# Patient Record
Sex: FEMALE | Race: WHITE | NOT HISPANIC OR LATINO | URBAN - METROPOLITAN AREA
[De-identification: names, ages, dates, MRNs, and addresses within clinical notes are randomized per-mention and may not be internally consistent; named-entity substitution may affect disease eponyms.]

---

## 2017-02-02 ENCOUNTER — APPOINTMENT (RX ONLY)
Dept: URBAN - METROPOLITAN AREA CLINIC 129 | Facility: CLINIC | Age: 72
Setting detail: DERMATOLOGY
End: 2017-02-02

## 2017-02-02 DIAGNOSIS — L57.0 ACTINIC KERATOSIS: ICD-10-CM

## 2017-02-02 PROCEDURE — ? LIQUID NITROGEN

## 2017-02-02 PROCEDURE — ? COUNSELING

## 2017-02-02 PROCEDURE — ? DIAGNOSIS COMMENT

## 2017-02-02 PROCEDURE — 17000 DESTRUCT PREMALG LESION: CPT

## 2017-02-02 ASSESSMENT — LOCATION ZONE DERM: LOCATION ZONE: LEG

## 2017-02-02 ASSESSMENT — LOCATION SIMPLE DESCRIPTION DERM: LOCATION SIMPLE: LEFT ACHILLES SKIN

## 2017-02-02 ASSESSMENT — LOCATION DETAILED DESCRIPTION DERM: LOCATION DETAILED: LEFT ACHILLES SKIN

## 2017-02-02 NOTE — PROCEDURE: LIQUID NITROGEN
Render Post-Care Instructions In Note?: no
Duration Of Freeze Thaw-Cycle (Seconds): 0
Detail Level: Detailed
Number Of Freeze-Thaw Cycles: 1 freeze-thaw cycle
Post-Care Instructions: I reviewed with the patient in detail post-care instructions. Patient is to wear sunprotection, and avoid picking at any of the treated lesions. Pt may apply Vaseline to crusted or scabbing areas.
Consent: The patient's consent was obtained including but not limited to risks of crusting, scabbing, blistering, scarring, darker or lighter pigmentary change, recurrence, incomplete removal and infection.

## 2018-09-06 ENCOUNTER — APPOINTMENT (EMERGENCY)
Dept: RADIOLOGY | Facility: HOSPITAL | Age: 73
DRG: 494 | End: 2018-09-06
Payer: MEDICARE

## 2018-09-06 ENCOUNTER — HOSPITAL ENCOUNTER (INPATIENT)
Facility: HOSPITAL | Age: 73
LOS: 3 days | Discharge: NURSING, REHAB OR LONG-TERM CARE | DRG: 494 | End: 2018-09-10
Attending: EMERGENCY MEDICINE | Admitting: ORTHOPAEDIC SURGERY
Payer: MEDICARE

## 2018-09-06 DIAGNOSIS — S82.891A CLOSED FRACTURE OF RIGHT ANKLE, INITIAL ENCOUNTER: ICD-10-CM

## 2018-09-06 DIAGNOSIS — S82.201A CLOSED FRACTURE OF RIGHT TIBIA AND FIBULA, INITIAL ENCOUNTER: ICD-10-CM

## 2018-09-06 DIAGNOSIS — S82.401A CLOSED FRACTURE OF RIGHT TIBIA AND FIBULA, INITIAL ENCOUNTER: ICD-10-CM

## 2018-09-06 DIAGNOSIS — S82.851A CLOSED TRIMALLEOLAR FRACTURE OF RIGHT ANKLE, INITIAL ENCOUNTER: Primary | ICD-10-CM

## 2018-09-06 PROCEDURE — 73610 X-RAY EXAM OF ANKLE: CPT

## 2018-09-06 PROCEDURE — 70450 CT HEAD/BRAIN W/O DYE: CPT

## 2018-09-06 PROCEDURE — 96375 TX/PRO/DX INJ NEW DRUG ADDON: CPT

## 2018-09-06 PROCEDURE — 96374 THER/PROPH/DIAG INJ IV PUSH: CPT

## 2018-09-06 PROCEDURE — 73600 X-RAY EXAM OF ANKLE: CPT

## 2018-09-06 RX ORDER — ALENDRONATE SODIUM 70 MG/1
70 TABLET ORAL
COMMUNITY
End: 2021-08-03

## 2018-09-06 RX ORDER — LIDOCAINE HYDROCHLORIDE 10 MG/ML
30 INJECTION, SOLUTION EPIDURAL; INFILTRATION; INTRACAUDAL; PERINEURAL ONCE
Status: COMPLETED | OUTPATIENT
Start: 2018-09-06 | End: 2018-09-06

## 2018-09-06 RX ORDER — SIMVASTATIN 20 MG
20 TABLET ORAL
COMMUNITY

## 2018-09-06 RX ORDER — LEVOTHYROXINE SODIUM 0.05 MG/1
50 TABLET ORAL DAILY
COMMUNITY

## 2018-09-06 RX ORDER — MELATONIN
2000 DAILY
COMMUNITY

## 2018-09-06 RX ORDER — ONDANSETRON 2 MG/ML
4 INJECTION INTRAMUSCULAR; INTRAVENOUS ONCE
Status: COMPLETED | OUTPATIENT
Start: 2018-09-06 | End: 2018-09-06

## 2018-09-06 RX ORDER — METOCLOPRAMIDE HYDROCHLORIDE 5 MG/ML
10 INJECTION INTRAMUSCULAR; INTRAVENOUS ONCE
Status: COMPLETED | OUTPATIENT
Start: 2018-09-06 | End: 2018-09-06

## 2018-09-06 RX ORDER — PROPOFOL 10 MG/ML
80 INJECTION, EMULSION INTRAVENOUS ONCE
Status: COMPLETED | OUTPATIENT
Start: 2018-09-06 | End: 2018-09-06

## 2018-09-06 RX ORDER — IRBESARTAN 150 MG/1
150 TABLET ORAL
COMMUNITY
End: 2021-08-03

## 2018-09-06 RX ADMIN — ONDANSETRON 4 MG: 2 INJECTION INTRAMUSCULAR; INTRAVENOUS at 19:08

## 2018-09-06 RX ADMIN — HYDROMORPHONE HYDROCHLORIDE 0.5 MG: 1 INJECTION, SOLUTION INTRAMUSCULAR; INTRAVENOUS; SUBCUTANEOUS at 19:19

## 2018-09-06 RX ADMIN — LIDOCAINE HYDROCHLORIDE 30 ML: 10 INJECTION, SOLUTION EPIDURAL; INFILTRATION; INTRACAUDAL; PERINEURAL at 22:29

## 2018-09-06 RX ADMIN — METOCLOPRAMIDE 10 MG: 5 INJECTION, SOLUTION INTRAMUSCULAR; INTRAVENOUS at 20:21

## 2018-09-06 RX ADMIN — PROPOFOL 60 MG: 10 INJECTION, EMULSION INTRAVENOUS at 20:27

## 2018-09-06 NOTE — ED PROVIDER NOTES
History  Chief Complaint   Patient presents with   Jace Person down 1 step, no LOC, denies hitting head, + deformity to right ankle, P/M/S intact     69 yo F presents to ED for R ankle pain and deformity after fall  Pt says she was going outside to see the rainbow when she missed a step and had mechanical fall  Pt says she did hit her head  No LOC  She complains only of R ankle pain  No neck pain  Pt says she had no nausea until she was given pain medication in the ambulance by EMS  She denies numbness/tingling  Denies preceding chest pain, shortness of breath, lightheadedness, dizziness, or heart palpitations  Prior to Admission Medications   Prescriptions Last Dose Informant Patient Reported? Taking? alendronate (FOSAMAX) 70 mg tablet  Self Yes Yes   Sig: Take 70 mg by mouth every 7 days   cholecalciferol (VITAMIN D3) 1,000 units tablet  Self Yes Yes   Sig: Take 2,000 Units by mouth daily   ipratropium (ATROVENT HFA) 17 mcg/act inhaler  Self Yes Yes   Sig: Inhale 2 puffs every 6 (six) hours   irbesartan (AVAPRO) 150 mg tablet  Self Yes Yes   Sig: Take 150 mg by mouth daily at bedtime   levothyroxine 50 mcg tablet  Self Yes Yes   Sig: Take 50 mcg by mouth daily   simvastatin (ZOCOR) 20 mg tablet  Self Yes Yes   Sig: Take 20 mg by mouth daily at bedtime      Facility-Administered Medications: None       Past Medical History:   Diagnosis Date    Hypertension        History reviewed  No pertinent surgical history  History reviewed  No pertinent family history  I have reviewed and agree with the history as documented  Social History   Substance Use Topics    Smoking status: Never Smoker    Smokeless tobacco: Never Used    Alcohol use No        Review of Systems   Constitutional: Negative for activity change, chills and fever  HENT: Negative for congestion, rhinorrhea, sore throat and trouble swallowing  Eyes: Negative for pain, discharge and visual disturbance     Respiratory: Negative for cough, chest tightness and shortness of breath  Cardiovascular: Negative for chest pain, palpitations and leg swelling  Gastrointestinal: Negative for abdominal pain, nausea and vomiting  Genitourinary: Negative for dysuria, frequency and urgency  Musculoskeletal: Negative for gait problem, neck pain and neck stiffness  Skin: Negative for color change, rash and wound  Neurological: Negative for dizziness, syncope, light-headedness and headaches  Physical Exam  ED Triage Vitals [09/06/18 1834]   Temperature Pulse Respirations Blood Pressure SpO2   97 9 °F (36 6 °C) 84 18 136/64 94 %      Temp Source Heart Rate Source Patient Position - Orthostatic VS BP Location FiO2 (%)   Oral Monitor Lying Right arm --      Pain Score       9           Orthostatic Vital Signs  Vitals:    09/06/18 2115 09/06/18 2125 09/06/18 2126 09/06/18 2245   BP: 137/65 123/58 124/58 143/57   Pulse: 90 94 88 96   Patient Position - Orthostatic VS: Sitting Sitting Sitting Sitting       Physical Exam   Constitutional: She is oriented to person, place, and time  She appears well-developed and well-nourished  No distress  HENT:   Head: Normocephalic and atraumatic  Nose: Nose normal    Mouth/Throat: Oropharynx is clear and moist    No external evidence of head injury   Eyes: Conjunctivae and EOM are normal  Pupils are equal, round, and reactive to light  Left eye exhibits no discharge  Neck: Normal range of motion  Neck supple  nontender to palpation   Cardiovascular: Normal rate, regular rhythm and intact distal pulses  Pulmonary/Chest: Effort normal and breath sounds normal  No stridor  No respiratory distress  She exhibits no tenderness  Abdominal: Soft  There is no tenderness  There is no rebound  Musculoskeletal: She exhibits tenderness and deformity  R ankle deformity, tenderness   Neurological: She is alert and oriented to person, place, and time  No sensory deficit   She exhibits normal muscle tone    Skin: Skin is warm and dry  Capillary refill takes less than 2 seconds  Abrasion R ankle   Nursing note and vitals reviewed  ED Medications  Medications    EMS REPLENISHMENT MED ( Does not apply Given to EMS 9/6/18 1838)    EMS REPLENISHMENT MED ( Does not apply Given to EMS 9/6/18 1854)   ondansetron [facility]) injection 4 mg (4 mg Intravenous Given 9/6/18 1908)   HYDROmorphone (DILAUDID) injection 0 5 mg (0 5 mg Intravenous Given 9/6/18 1919)   propofol (DIPRIVAN) 200 MG/20ML bolus injection 80 mg (60 mg Intravenous Given by Other 9/6/18 2027)   metoclopramide (REGLAN) injection 10 mg (10 mg Intravenous Given 9/6/18 2021)   lidocaine (PF) (XYLOCAINE-MPF) 1 % injection 30 mL (30 mL Infiltration Given by Other 9/6/18 2229)       Diagnostic Studies  Results Reviewed     None                 CT head without contrast   Final Result by Lashawn Shell DO (09/06 2022)   No acute intracranial abnormality  Workstation performed: [id number]         XR ankle 2 vw right    (Results Pending)   XR ankle 3+ vw right    (Results Pending)         Procedures  Orthopedic Injury  Date/Time: 9/6/2018 8:30 PM  Performed by: Denisse Mar by: [facility]     Patient Location:  ED  Written consent obtained?: Yes    Risks and benefits: Risks, benefits and alternatives were discussed    Consent given by:  Patient and spouse  Patient states understanding of procedure being performed: Yes    Patient's understanding of procedure matches consent: Yes    Procedure consent matches procedure scheduled: Yes    Relevant documents present and verified: Yes    Test results available and properly labeled: Yes    Site marked: Yes    Radiology Images displayed and confirmed   If images not available, report reviewed: Yes    Required items: Required blood products, implants, devices and special equipment available    Patient identity confirmed:  Verbally with patient and arm band  Time out: Immediately prior to the procedure a time out was called    Injury location:  Ankle  Location details:  Right ankle  Injury type:  Fracture  Fracture type: trimalleolar    Neurovascular status: Neurovascularly intact    Distal perfusion: normal    Neurological function: normal    Range of motion: reduced    Sedation type: Moderate (conscious) sedation (See separate Procedural Sedation form)  Manipulation performed?: Yes    Reduction successful?: Yes    Confirmation: Reduction confirmed by x-ray    Immobilization:  Splint  Splint type: posterior leg, stirrup  Supplies used:  Ortho-Glass and elastic bandage  Neurovascular status: Neurovascularly intact    Distal perfusion: normal    Neurological function: normal    Patient tolerance:  Patient tolerated the procedure well with no immediate complications    Procedural Sedation  Date/Time: 9/6/2018 8:30 PM  Performed by: Gasper Abreu by: Kenia      Consent:     Consent obtained:  Written    Consent given by:  Patient and spouse    Risks discussed: Allergic reaction, prolonged hypoxia resulting in organ damage, prolonged sedation necessitating reversal, dysrhythmia, inadequate sedation, respiratory compromise necessitating ventilatory assistance and intubation, vomiting and nausea  Universal protocol:     Procedure explained and questions answered to patient or proxy's satisfaction: yes      Relevant documents present and verified: yes      Test results available and properly labeled: yes      Radiology Images displayed and confirmed    If images not available, report reviewed: yes      Required blood products, implants, devices, and special equipment available: yes      Site/side marked: yes      Immediately prior to procedure a time out was called: yes      Patient identity confirmation method:  Verbally with patient and arm band  Indications:     Sedation purpose:  Fracture reduction    Intended level of sedation:  Moderate (conscious sedation)  Immediate pre-procedure details:     Reassessment: Patient reassessed immediately prior to procedure      Reviewed: vital signs, relevant labs/tests and NPO status      Verified: bag valve mask available, emergency equipment available, intubation equipment available, IV patency confirmed, oxygen available and suction available    Procedure details (see MAR for exact dosages):     Sedation start time:  9/6/2018 8:30 PM    Preoxygenation:  Room air    Sedation:  Propofol    Intra-procedure monitoring:  Blood pressure monitoring, continuous capnometry, frequent LOC assessments, frequent vital sign checks, continuous pulse oximetry and cardiac monitor    Intra-procedure events: none      Intra-procedure management:  Airway repositioning    Sedation end time:  9/6/2018 9:02 PM    Total sedation time (minutes):  30  Post-procedure details:     Post-sedation assessment completed:  9/6/2018 9:03 PM    Attendance: Constant attendance by certified staff until patient recovered      Recovery: Patient returned to pre-procedure baseline      Post-sedation assessments completed and reviewed: airway patency, cardiovascular function, mental status, nausea/vomiting, pain level and respiratory function      Patient is stable for discharge or admission: yes      Patient tolerance: Tolerated well, no immediate complications      Conscious Sedation Assessment      Classification Score   ASA Scale Assessment  1-Healthy patient, no disease outside surgical process filed at 09/06/2018 2026          Phone Consults  ED Phone Contact    ED Course           Identification of Seniors at Risk      Most Recent Value   (ISAR) Identification of Seniors at Risk   Before the illness or injury that brought you to the Emergency, did you need someone to help you on a regular basis? 0 Filed at: 09/06/2018 1836   In the last 24 hours, have you needed more help than usual?  0 Filed at: 09/06/2018 1836   Have you been hospitalized for one or more nights during the past 6 months? 0 Filed at: 09/06/2018 1836   In general, do you see well?  0 Filed at: 09/06/2018 1836   In general, do you have serious problems with your memory? 0 Filed at: 09/06/2018 1836   Do you take more than three different medications every day? 1 Filed at: 09/06/2018 1836   ISAR Score  1 Filed at: 09/06/2018 1836                          [facility]  Number of Diagnoses or Management Options  Closed fracture of right tibia and fibula, initial encounter:   Diagnosis management comments: Trimalleolar R ankle fracture  Neurovascularly intact  Reduced at bedside with propofol  Remains neurovascularly intact  CT head negative  Ortho consulted and will admit pt, plan for OR tmrw  CritCare Time    Disposition  Final diagnoses:   Closed fracture of right tibia and fibula, initial encounter   Closed trimalleolar fracture of right ankle, initial encounter     Time reflects when diagnosis was documented in both MDM as applicable and the Disposition within this note     Time User Action Codes Description Comment    9/6/2018  8:42 PM Epimenio Girt Add [S82 201A,  S82 401A] Closed fracture of right tibia and fibula, initial encounter     9/6/2018 11:38 PM Evangelina Woodson Add [O93 759I] Closed fracture of right ankle, initial encounter     9/7/2018 12:01 AM Epimenio Girt Add [S61 101P] Closed trimalleolar fracture of right ankle, initial encounter     9/7/2018 12:01 AM Epimenio Girt Modify [G12 637C,  S82 401A] Closed fracture of right tibia and fibula, initial encounter     9/7/2018 12:01 AM Epimenio Girt Modify [C58 389W] Closed trimalleolar fracture of right ankle, initial encounter       ED Disposition     ED Disposition Condition Comment    Admit  Case was discussed with ortho and the patient's admission status was agreed to be Admission Status: observation status to the service of Dr Aryan Denis           Follow-up Information    None         Patient's Medications   Discharge Prescriptions    No medications on file     No discharge procedures on file     ED Provider  Attending physically available and evaluated Colton Balderas I managed the patient along with the ED Attending      Electronically Signed by         Zoraida Schneider MD  09/07/18 0001

## 2018-09-07 ENCOUNTER — APPOINTMENT (OUTPATIENT)
Dept: RADIOLOGY | Facility: HOSPITAL | Age: 73
DRG: 494 | End: 2018-09-07
Payer: MEDICARE

## 2018-09-07 ENCOUNTER — ANESTHESIA EVENT (OUTPATIENT)
Dept: PERIOP | Facility: HOSPITAL | Age: 73
DRG: 494 | End: 2018-09-07
Payer: MEDICARE

## 2018-09-07 ENCOUNTER — ANESTHESIA (OUTPATIENT)
Dept: PERIOP | Facility: HOSPITAL | Age: 73
DRG: 494 | End: 2018-09-07
Payer: MEDICARE

## 2018-09-07 PROBLEM — E03.8 OTHER SPECIFIED HYPOTHYROIDISM: Status: ACTIVE | Noted: 2018-09-07

## 2018-09-07 PROBLEM — D72.829 LEUKOCYTOSIS: Status: ACTIVE | Noted: 2018-09-07

## 2018-09-07 PROBLEM — J44.9 COPD (CHRONIC OBSTRUCTIVE PULMONARY DISEASE) (HCC): Status: ACTIVE | Noted: 2018-09-07

## 2018-09-07 PROBLEM — E78.49 OTHER HYPERLIPIDEMIA: Status: ACTIVE | Noted: 2018-09-07

## 2018-09-07 LAB
25(OH)D3 SERPL-MCNC: 28.3 NG/ML (ref 30–100)
ABO GROUP BLD: NORMAL
ALBUMIN SERPL BCP-MCNC: 3.4 G/DL (ref 3.5–5)
ALP SERPL-CCNC: 44 U/L (ref 46–116)
ALT SERPL W P-5'-P-CCNC: 25 U/L (ref 12–78)
ANION GAP SERPL CALCULATED.3IONS-SCNC: 6 MMOL/L (ref 4–13)
ANION GAP SERPL CALCULATED.3IONS-SCNC: 8 MMOL/L (ref 4–13)
APTT PPP: 27 SECONDS (ref 24–36)
AST SERPL W P-5'-P-CCNC: 18 U/L (ref 5–45)
ATRIAL RATE: 95 BPM
BILIRUB SERPL-MCNC: 0.37 MG/DL (ref 0.2–1)
BLD GP AB SCN SERPL QL: NEGATIVE
BUN SERPL-MCNC: 12 MG/DL (ref 5–25)
BUN SERPL-MCNC: 7 MG/DL (ref 5–25)
CALCIUM SERPL-MCNC: 7.9 MG/DL (ref 8.3–10.1)
CALCIUM SERPL-MCNC: 8.6 MG/DL (ref 8.3–10.1)
CHLORIDE SERPL-SCNC: 102 MMOL/L (ref 100–108)
CHLORIDE SERPL-SCNC: 103 MMOL/L (ref 100–108)
CO2 SERPL-SCNC: 28 MMOL/L (ref 21–32)
CO2 SERPL-SCNC: 29 MMOL/L (ref 21–32)
CREAT SERPL-MCNC: 0.65 MG/DL (ref 0.6–1.3)
CREAT SERPL-MCNC: 0.74 MG/DL (ref 0.6–1.3)
ERYTHROCYTE [DISTWIDTH] IN BLOOD BY AUTOMATED COUNT: 13 % (ref 11.6–15.1)
ERYTHROCYTE [SEDIMENTATION RATE] IN BLOOD: 38 MM/HOUR (ref 0–20)
GFR SERPL CREATININE-BSD FRML MDRD: 81 ML/MIN/1.73SQ M
GFR SERPL CREATININE-BSD FRML MDRD: 89 ML/MIN/1.73SQ M
GLUCOSE P FAST SERPL-MCNC: 137 MG/DL (ref 65–99)
GLUCOSE SERPL-MCNC: 116 MG/DL (ref 65–140)
GLUCOSE SERPL-MCNC: 137 MG/DL (ref 65–140)
HCT VFR BLD AUTO: 40.2 % (ref 34.8–46.1)
HGB BLD-MCNC: 12.8 G/DL (ref 11.5–15.4)
INR PPP: 0.98 (ref 0.86–1.17)
MCH RBC QN AUTO: 31.4 PG (ref 26.8–34.3)
MCHC RBC AUTO-ENTMCNC: 31.8 G/DL (ref 31.4–37.4)
MCV RBC AUTO: 99 FL (ref 82–98)
P AXIS: 58 DEGREES
PLATELET # BLD AUTO: 330 THOUSANDS/UL (ref 149–390)
PMV BLD AUTO: 9.8 FL (ref 8.9–12.7)
POTASSIUM SERPL-SCNC: 3.9 MMOL/L (ref 3.5–5.3)
POTASSIUM SERPL-SCNC: 4.1 MMOL/L (ref 3.5–5.3)
PR INTERVAL: 144 MS
PROT SERPL-MCNC: 7.2 G/DL (ref 6.4–8.2)
PROTHROMBIN TIME: 13.1 SECONDS (ref 11.8–14.2)
PTH-INTACT SERPL-MCNC: 135.2 PG/ML (ref 18.4–80.1)
QRS AXIS: 32 DEGREES
QRSD INTERVAL: 80 MS
QT INTERVAL: 370 MS
QTC INTERVAL: 464 MS
RBC # BLD AUTO: 4.08 MILLION/UL (ref 3.81–5.12)
RH BLD: POSITIVE
SODIUM SERPL-SCNC: 137 MMOL/L (ref 136–145)
SODIUM SERPL-SCNC: 139 MMOL/L (ref 136–145)
SPECIMEN EXPIRATION DATE: NORMAL
T WAVE AXIS: 51 DEGREES
T4 FREE SERPL-MCNC: 1.28 NG/DL (ref 0.76–1.46)
TSH SERPL DL<=0.05 MIU/L-ACNC: 3.29 UIU/ML (ref 0.36–3.74)
VENTRICULAR RATE: 95 BPM
WBC # BLD AUTO: 15.66 THOUSAND/UL (ref 4.31–10.16)

## 2018-09-07 PROCEDURE — C1769 GUIDE WIRE: HCPCS | Performed by: ORTHOPAEDIC SURGERY

## 2018-09-07 PROCEDURE — 27814 TREATMENT OF ANKLE FRACTURE: CPT | Performed by: PHYSICIAN ASSISTANT

## 2018-09-07 PROCEDURE — 86850 RBC ANTIBODY SCREEN: CPT | Performed by: ORTHOPAEDIC SURGERY

## 2018-09-07 PROCEDURE — 84443 ASSAY THYROID STIM HORMONE: CPT | Performed by: ORTHOPAEDIC SURGERY

## 2018-09-07 PROCEDURE — 85610 PROTHROMBIN TIME: CPT | Performed by: ORTHOPAEDIC SURGERY

## 2018-09-07 PROCEDURE — 99225 PR SBSQ OBSERVATION CARE/DAY 25 MINUTES: CPT | Performed by: INTERNAL MEDICINE

## 2018-09-07 PROCEDURE — C1713 ANCHOR/SCREW BN/BN,TIS/BN: HCPCS | Performed by: ORTHOPAEDIC SURGERY

## 2018-09-07 PROCEDURE — 99285 EMERGENCY DEPT VISIT HI MDM: CPT

## 2018-09-07 PROCEDURE — 82306 VITAMIN D 25 HYDROXY: CPT | Performed by: ORTHOPAEDIC SURGERY

## 2018-09-07 PROCEDURE — 84165 PROTEIN E-PHORESIS SERUM: CPT | Performed by: PATHOLOGY

## 2018-09-07 PROCEDURE — 0QSGXZZ REPOSITION RIGHT TIBIA, EXTERNAL APPROACH: ICD-10-PCS | Performed by: EMERGENCY MEDICINE

## 2018-09-07 PROCEDURE — 85652 RBC SED RATE AUTOMATED: CPT | Performed by: ORTHOPAEDIC SURGERY

## 2018-09-07 PROCEDURE — 80048 BASIC METABOLIC PNL TOTAL CA: CPT | Performed by: ORTHOPAEDIC SURGERY

## 2018-09-07 PROCEDURE — 85027 COMPLETE CBC AUTOMATED: CPT | Performed by: ORTHOPAEDIC SURGERY

## 2018-09-07 PROCEDURE — 80053 COMPREHEN METABOLIC PANEL: CPT | Performed by: ORTHOPAEDIC SURGERY

## 2018-09-07 PROCEDURE — 73600 X-RAY EXAM OF ANKLE: CPT

## 2018-09-07 PROCEDURE — 99222 1ST HOSP IP/OBS MODERATE 55: CPT | Performed by: ORTHOPAEDIC SURGERY

## 2018-09-07 PROCEDURE — 86901 BLOOD TYPING SEROLOGIC RH(D): CPT | Performed by: ORTHOPAEDIC SURGERY

## 2018-09-07 PROCEDURE — 0QSJ04Z REPOSITION RIGHT FIBULA WITH INTERNAL FIXATION DEVICE, OPEN APPROACH: ICD-10-PCS | Performed by: ORTHOPAEDIC SURGERY

## 2018-09-07 PROCEDURE — 84165 PROTEIN E-PHORESIS SERUM: CPT | Performed by: INTERNAL MEDICINE

## 2018-09-07 PROCEDURE — 93005 ELECTROCARDIOGRAM TRACING: CPT

## 2018-09-07 PROCEDURE — 84166 PROTEIN E-PHORESIS/URINE/CSF: CPT | Performed by: PATHOLOGY

## 2018-09-07 PROCEDURE — 71045 X-RAY EXAM CHEST 1 VIEW: CPT

## 2018-09-07 PROCEDURE — 86900 BLOOD TYPING SEROLOGIC ABO: CPT | Performed by: ORTHOPAEDIC SURGERY

## 2018-09-07 PROCEDURE — 93010 ELECTROCARDIOGRAM REPORT: CPT | Performed by: INTERNAL MEDICINE

## 2018-09-07 PROCEDURE — 83970 ASSAY OF PARATHORMONE: CPT | Performed by: ORTHOPAEDIC SURGERY

## 2018-09-07 PROCEDURE — 85730 THROMBOPLASTIN TIME PARTIAL: CPT | Performed by: ORTHOPAEDIC SURGERY

## 2018-09-07 PROCEDURE — 0QSG04Z REPOSITION RIGHT TIBIA WITH INTERNAL FIXATION DEVICE, OPEN APPROACH: ICD-10-PCS | Performed by: ORTHOPAEDIC SURGERY

## 2018-09-07 PROCEDURE — 27814 TREATMENT OF ANKLE FRACTURE: CPT | Performed by: ORTHOPAEDIC SURGERY

## 2018-09-07 PROCEDURE — 84439 ASSAY OF FREE THYROXINE: CPT | Performed by: INTERNAL MEDICINE

## 2018-09-07 PROCEDURE — 0QSJXZZ REPOSITION RIGHT FIBULA, EXTERNAL APPROACH: ICD-10-PCS | Performed by: EMERGENCY MEDICINE

## 2018-09-07 DEVICE — 4.0MM CANNULATED SCREW SHORT THREAD/50MM: Type: IMPLANTABLE DEVICE | Status: FUNCTIONAL

## 2018-09-07 DEVICE — 2.7MM/3.5MM LCP LATERAL DISTAL FIBULA PLATE 6H/RIGHT/112MM
Type: IMPLANTABLE DEVICE | Status: FUNCTIONAL
Brand: LCP

## 2018-09-07 DEVICE — 2.7MM LOCKING SCREW SLF-TPNG WITH T8 STARDRIVE RECESS 12MM: Type: IMPLANTABLE DEVICE | Status: FUNCTIONAL

## 2018-09-07 DEVICE — 2.7MM LOCKING SCREW SLF-TPNG WITH T8 STARDRIVE RECESS 18MM: Type: IMPLANTABLE DEVICE | Status: FUNCTIONAL

## 2018-09-07 DEVICE — 3.5MM CORTEX SCREW SELF-TAPPING 14MM: Type: IMPLANTABLE DEVICE | Status: FUNCTIONAL

## 2018-09-07 DEVICE — 2.7MM LOCKING SCREW SLF-TPNG WITH T8 STARDRIVE RECESS 16MM: Type: IMPLANTABLE DEVICE | Status: FUNCTIONAL

## 2018-09-07 RX ORDER — FENTANYL CITRATE 50 UG/ML
INJECTION, SOLUTION INTRAMUSCULAR; INTRAVENOUS AS NEEDED
Status: DISCONTINUED | OUTPATIENT
Start: 2018-09-07 | End: 2018-09-07 | Stop reason: SURG

## 2018-09-07 RX ORDER — PROPOFOL 10 MG/ML
INJECTION, EMULSION INTRAVENOUS AS NEEDED
Status: DISCONTINUED | OUTPATIENT
Start: 2018-09-07 | End: 2018-09-07 | Stop reason: SURG

## 2018-09-07 RX ORDER — LABETALOL HYDROCHLORIDE 5 MG/ML
INJECTION, SOLUTION INTRAVENOUS AS NEEDED
Status: DISCONTINUED | OUTPATIENT
Start: 2018-09-07 | End: 2018-09-07 | Stop reason: SURG

## 2018-09-07 RX ORDER — ALBUTEROL SULFATE 90 UG/1
AEROSOL, METERED RESPIRATORY (INHALATION) AS NEEDED
Status: DISCONTINUED | OUTPATIENT
Start: 2018-09-07 | End: 2018-09-07 | Stop reason: SURG

## 2018-09-07 RX ORDER — OXYCODONE HYDROCHLORIDE 5 MG/1
TABLET ORAL
Qty: 30 TABLET | Refills: 0 | Status: SHIPPED | OUTPATIENT
Start: 2018-09-07 | End: 2021-08-03

## 2018-09-07 RX ORDER — OXYCODONE HYDROCHLORIDE 5 MG/1
5 TABLET ORAL EVERY 4 HOURS PRN
Status: DISCONTINUED | OUTPATIENT
Start: 2018-09-07 | End: 2018-09-10 | Stop reason: HOSPADM

## 2018-09-07 RX ORDER — OXYCODONE HYDROCHLORIDE 10 MG/1
10 TABLET ORAL EVERY 4 HOURS PRN
Status: DISCONTINUED | OUTPATIENT
Start: 2018-09-07 | End: 2018-09-10 | Stop reason: HOSPADM

## 2018-09-07 RX ORDER — ONDANSETRON 2 MG/ML
4 INJECTION INTRAMUSCULAR; INTRAVENOUS EVERY 6 HOURS PRN
Status: DISCONTINUED | OUTPATIENT
Start: 2018-09-07 | End: 2018-09-10 | Stop reason: HOSPADM

## 2018-09-07 RX ORDER — ONDANSETRON 2 MG/ML
INJECTION INTRAMUSCULAR; INTRAVENOUS AS NEEDED
Status: DISCONTINUED | OUTPATIENT
Start: 2018-09-07 | End: 2018-09-07 | Stop reason: SURG

## 2018-09-07 RX ORDER — SUCCINYLCHOLINE CHLORIDE 20 MG/ML
INJECTION INTRAMUSCULAR; INTRAVENOUS AS NEEDED
Status: DISCONTINUED | OUTPATIENT
Start: 2018-09-07 | End: 2018-09-07 | Stop reason: SURG

## 2018-09-07 RX ORDER — LIDOCAINE HYDROCHLORIDE 10 MG/ML
INJECTION, SOLUTION INFILTRATION; PERINEURAL AS NEEDED
Status: DISCONTINUED | OUTPATIENT
Start: 2018-09-07 | End: 2018-09-07 | Stop reason: SURG

## 2018-09-07 RX ORDER — PRAVASTATIN SODIUM 40 MG
40 TABLET ORAL
Status: DISCONTINUED | OUTPATIENT
Start: 2018-09-07 | End: 2018-09-10 | Stop reason: HOSPADM

## 2018-09-07 RX ORDER — LEVOTHYROXINE SODIUM 0.05 MG/1
50 TABLET ORAL
Status: DISCONTINUED | OUTPATIENT
Start: 2018-09-07 | End: 2018-09-10 | Stop reason: HOSPADM

## 2018-09-07 RX ORDER — MIDAZOLAM HYDROCHLORIDE 1 MG/ML
INJECTION INTRAMUSCULAR; INTRAVENOUS AS NEEDED
Status: DISCONTINUED | OUTPATIENT
Start: 2018-09-07 | End: 2018-09-07 | Stop reason: SURG

## 2018-09-07 RX ORDER — LOSARTAN POTASSIUM 50 MG/1
50 TABLET ORAL DAILY
Status: DISCONTINUED | OUTPATIENT
Start: 2018-09-07 | End: 2018-09-10 | Stop reason: HOSPADM

## 2018-09-07 RX ORDER — MAGNESIUM HYDROXIDE 1200 MG/15ML
LIQUID ORAL AS NEEDED
Status: DISCONTINUED | OUTPATIENT
Start: 2018-09-07 | End: 2018-09-07 | Stop reason: HOSPADM

## 2018-09-07 RX ORDER — FENTANYL CITRATE/PF 50 MCG/ML
25 SYRINGE (ML) INJECTION
Status: COMPLETED | OUTPATIENT
Start: 2018-09-07 | End: 2018-09-07

## 2018-09-07 RX ORDER — ONDANSETRON 2 MG/ML
4 INJECTION INTRAMUSCULAR; INTRAVENOUS ONCE
Status: DISCONTINUED | OUTPATIENT
Start: 2018-09-07 | End: 2018-09-10 | Stop reason: HOSPADM

## 2018-09-07 RX ORDER — ACETAMINOPHEN 325 MG/1
975 TABLET ORAL EVERY 8 HOURS SCHEDULED
Status: DISCONTINUED | OUTPATIENT
Start: 2018-09-07 | End: 2018-09-10 | Stop reason: HOSPADM

## 2018-09-07 RX ORDER — ONDANSETRON 2 MG/ML
4 INJECTION INTRAMUSCULAR; INTRAVENOUS ONCE AS NEEDED
Status: DISCONTINUED | OUTPATIENT
Start: 2018-09-07 | End: 2018-09-07 | Stop reason: HOSPADM

## 2018-09-07 RX ORDER — DOCUSATE SODIUM 100 MG/1
100 CAPSULE, LIQUID FILLED ORAL 2 TIMES DAILY
Status: DISCONTINUED | OUTPATIENT
Start: 2018-09-07 | End: 2018-09-10 | Stop reason: HOSPADM

## 2018-09-07 RX ORDER — CALCIUM CARBONATE 200(500)MG
1000 TABLET,CHEWABLE ORAL ONCE
Status: DISCONTINUED | OUTPATIENT
Start: 2018-09-07 | End: 2018-09-08

## 2018-09-07 RX ORDER — SODIUM CHLORIDE, SODIUM LACTATE, POTASSIUM CHLORIDE, CALCIUM CHLORIDE 600; 310; 30; 20 MG/100ML; MG/100ML; MG/100ML; MG/100ML
INJECTION, SOLUTION INTRAVENOUS CONTINUOUS PRN
Status: DISCONTINUED | OUTPATIENT
Start: 2018-09-07 | End: 2018-09-07 | Stop reason: SURG

## 2018-09-07 RX ORDER — SENNOSIDES 8.6 MG
1 TABLET ORAL DAILY
Status: DISCONTINUED | OUTPATIENT
Start: 2018-09-07 | End: 2018-09-10 | Stop reason: HOSPADM

## 2018-09-07 RX ORDER — CEFAZOLIN SODIUM 1 G/3ML
INJECTION, POWDER, FOR SOLUTION INTRAMUSCULAR; INTRAVENOUS AS NEEDED
Status: DISCONTINUED | OUTPATIENT
Start: 2018-09-07 | End: 2018-09-07 | Stop reason: SURG

## 2018-09-07 RX ADMIN — HYDROMORPHONE HYDROCHLORIDE 0.5 MG: 1 INJECTION, SOLUTION INTRAMUSCULAR; INTRAVENOUS; SUBCUTANEOUS at 14:00

## 2018-09-07 RX ADMIN — FENTANYL CITRATE 25 MCG: 50 INJECTION, SOLUTION INTRAMUSCULAR; INTRAVENOUS at 12:41

## 2018-09-07 RX ADMIN — SUCCINYLCHOLINE CHLORIDE 100 MG: 20 INJECTION, SOLUTION INTRAMUSCULAR; INTRAVENOUS at 12:08

## 2018-09-07 RX ADMIN — FENTANYL CITRATE 25 MCG: 50 INJECTION, SOLUTION INTRAMUSCULAR; INTRAVENOUS at 13:45

## 2018-09-07 RX ADMIN — FENTANYL CITRATE 25 MCG: 50 INJECTION, SOLUTION INTRAMUSCULAR; INTRAVENOUS at 13:30

## 2018-09-07 RX ADMIN — LIDOCAINE HYDROCHLORIDE 50 MG: 10 INJECTION, SOLUTION INFILTRATION; PERINEURAL at 11:56

## 2018-09-07 RX ADMIN — ACETAMINOPHEN 975 MG: 325 TABLET, FILM COATED ORAL at 21:50

## 2018-09-07 RX ADMIN — ACETAMINOPHEN 975 MG: 325 TABLET, FILM COATED ORAL at 06:02

## 2018-09-07 RX ADMIN — HYDROMORPHONE HYDROCHLORIDE 0.5 MG: 1 INJECTION, SOLUTION INTRAMUSCULAR; INTRAVENOUS; SUBCUTANEOUS at 14:35

## 2018-09-07 RX ADMIN — FENTANYL CITRATE 50 MCG: 50 INJECTION, SOLUTION INTRAMUSCULAR; INTRAVENOUS at 11:58

## 2018-09-07 RX ADMIN — ALBUTEROL SULFATE 4 PUFF: 90 AEROSOL, METERED RESPIRATORY (INHALATION) at 12:55

## 2018-09-07 RX ADMIN — SODIUM CHLORIDE, SODIUM LACTATE, POTASSIUM CHLORIDE, AND CALCIUM CHLORIDE: .6; .31; .03; .02 INJECTION, SOLUTION INTRAVENOUS at 11:48

## 2018-09-07 RX ADMIN — FENTANYL CITRATE 50 MCG: 50 INJECTION, SOLUTION INTRAMUSCULAR; INTRAVENOUS at 12:15

## 2018-09-07 RX ADMIN — FENTANYL CITRATE 25 MCG: 50 INJECTION, SOLUTION INTRAMUSCULAR; INTRAVENOUS at 13:35

## 2018-09-07 RX ADMIN — HYDROMORPHONE HYDROCHLORIDE 0.5 MG: 1 INJECTION, SOLUTION INTRAMUSCULAR; INTRAVENOUS; SUBCUTANEOUS at 16:25

## 2018-09-07 RX ADMIN — IPRATROPIUM BROMIDE 2 PUFF: 17 AEROSOL, METERED RESPIRATORY (INHALATION) at 21:50

## 2018-09-07 RX ADMIN — FENTANYL CITRATE 25 MCG: 50 INJECTION, SOLUTION INTRAMUSCULAR; INTRAVENOUS at 13:40

## 2018-09-07 RX ADMIN — CEFAZOLIN SODIUM 2000 MG: 2 SOLUTION INTRAVENOUS at 21:50

## 2018-09-07 RX ADMIN — PROPOFOL 200 MG: 10 INJECTION, EMULSION INTRAVENOUS at 11:56

## 2018-09-07 RX ADMIN — ALBUTEROL SULFATE 2 PUFF: 90 AEROSOL, METERED RESPIRATORY (INHALATION) at 12:12

## 2018-09-07 RX ADMIN — LABETALOL HYDROCHLORIDE 5 MG: 5 INJECTION, SOLUTION INTRAVENOUS at 13:13

## 2018-09-07 RX ADMIN — FENTANYL CITRATE 50 MCG: 50 INJECTION, SOLUTION INTRAMUSCULAR; INTRAVENOUS at 12:04

## 2018-09-07 RX ADMIN — FENTANYL CITRATE 25 MCG: 50 INJECTION, SOLUTION INTRAMUSCULAR; INTRAVENOUS at 12:39

## 2018-09-07 RX ADMIN — IPRATROPIUM BROMIDE 2 PUFF: 17 AEROSOL, METERED RESPIRATORY (INHALATION) at 09:22

## 2018-09-07 RX ADMIN — CEFAZOLIN 2000 MG: 1 INJECTION, POWDER, FOR SOLUTION INTRAVENOUS at 11:58

## 2018-09-07 RX ADMIN — ONDANSETRON 4 MG: 2 INJECTION INTRAMUSCULAR; INTRAVENOUS at 12:55

## 2018-09-07 RX ADMIN — MIDAZOLAM 2 MG: 1 INJECTION INTRAMUSCULAR; INTRAVENOUS at 11:48

## 2018-09-07 RX ADMIN — HYDROMORPHONE HYDROCHLORIDE 0.5 MG: 1 INJECTION, SOLUTION INTRAMUSCULAR; INTRAVENOUS; SUBCUTANEOUS at 13:55

## 2018-09-07 RX ADMIN — DEXAMETHASONE SODIUM PHOSPHATE 5 MG: 10 INJECTION INTRAMUSCULAR; INTRAVENOUS at 12:15

## 2018-09-07 RX ADMIN — LEVOTHYROXINE SODIUM 50 MCG: 50 TABLET ORAL at 06:02

## 2018-09-07 NOTE — PLAN OF CARE
Problem: DISCHARGE PLANNING - CARE MANAGEMENT  Goal: Discharge to post-acute care or home with appropriate resources  INTERVENTIONS:  - Conduct assessment to determine patient/family and health care team treatment goals, and need for post-acute services based on payer coverage, community resources, and patient preferences, and barriers to discharge  - Address psychosocial, clinical, and financial barriers to discharge as identified in assessment in conjunction with the patient/family and health care team  - Arrange appropriate level of post-acute services according to patient's   needs and preference and payer coverage in collaboration with the physician and health care team  - Communicate with and update the patient/family, physician, and health care team regarding progress on the discharge plan  - Arrange appropriate transportation to post-acute venues  - CM will assist Pt with a price check on recommended d/c medication of Lovenox  Outcome: Progressing

## 2018-09-07 NOTE — OP NOTE
OPERATIVE REPORT  PATIENT NAME: Clementina Hammer    :  1945  MRN: [id number]  Pt Location: BE OR ROOM 18    SURGERY DATE: 2018    Surgeon(s) and Role:     * Mela Howard MD - Primary     * Mary Leyva PA-C - Assisting ( No qualified resident available)    Preop Diagnosis:  Closed fracture of right ankle, initial encounter [P02 135Q]    Post-Op Diagnosis Codes:     * Closed fracture of right ankle, initial encounter [H34 153M]    Procedure(s) (LRB):  OPEN REDUCTION W/ INTERNAL FIXATION (ORIF) ANKLE (Right)    Specimen(s):  * No specimens in log *    Estimated Blood Loss:   Minimal    Drains:       Anesthesia Type:   General    Operative Indications:  Closed fracture of right ankle, initial encounter [Q77 415I]      Operative Findings:  Right bimalleolar ankle fracture    Complications:   None    Procedure and Technique:  Open reduction internal fixation right ankle fracture  Patient was correctly identified by both the anesthesia department and myself  The right lower extremity was marked  She was taken to the operating room with general anesthesia was induced in the supine position  She was positioned supine onto radiolucent flat table ensuring that all bony prominences and neurovascular structures were adequately padded and protected  A tourniquet was applied to the right lower extremity  A bump was placed underneath the right hip  The right lower extremity was prepped and draped in the usual sterile fashion  An SCD had been attached left side  IV Ancef was administered for preoperative antibiotics  Time-out was performed  AP and lateral imaging demonstrated with the incisions would be made overlying the lateral malleolus as well as the medial malleolus  The lower extremity was elevated and exsanguinated with the use of an Esmarch  The tourniquet was inflated to a pressure of 300 mm of mercury    A 6 cm longitudinal incision was made over the lateral aspect of the fibula with the use of a 10 blade  Distally dissection was carried sharply to the level of the bone with the use of the Bovie electric cautery  In the midportion of the incision and proximally the dissection was performed with Metzenbaum scissors and pickups to avoid damage to the superficial peroneal nerve  Fracture site was identified and delineated  Bone reduction forceps were used to hold the fibula in alignment  Significant comminution was encountered at the fracture site not amenable to lag screw fixation  A Synthes 6 hole distal fibular locking plate was then applied in a bridging fashion this was secured in place distally with locking screws and proximally with nonlocking cortical screws  Following fixation AP and lateral imaging demonstrated appropriate length and reduction/fixation of the fibular fracture  Attention was paid to the medial side  A 3 cm curvilinear incision was made over the medial malleolus  The saphenous vein was identified and protected  The fracture site was identified  A reduction was performed with bone reduction forceps  Two K-wires were then passed from the tip of the medial malleolus proximally in a divergent fashion  Measurement was taken  Cannulated short threaded screws measuring 50 mm were passed over the K-wires  K-wires removed and the screws were final tightened  Final AP and lateral imaging demonstrated appropriate fixation with symmetric mortise  Stress test was performed across the ankle with no evidence of medial clear space widening  The mortise view demonstrated good alignment and fixation  Copious irrigation was performed to all wounds  Hemostasis was achieved  The subcu tissue overlying the plate was reapproximated with 0 Vicryl suture  Subcutaneous layer on the lateral side was reapproximated with 2 Vicryl suture in interrupted fashion  Three nylon sutures utilized on the skin   On the medial side subcutaneous layer was closed with 2 Vicryl suture and the skin was closed with 3 nylon suture in interrupted fashion  Sterile dressings were applied to the wound   The tourniquet was let down after 54 min  A well-molded AO splint was then applied         I was present for the entire procedure    Patient Disposition:  PACU      Implants Synthes 6 hole distal fibula locking plate    SIGNATURE: Deon Gardner MD  DATE: September 7, 2018  TIME: 1:25 PM

## 2018-09-07 NOTE — ASSESSMENT & PLAN NOTE
History of COPD diagnosed 2 years ago not on home oxygen  She is only on Atrovent inhaler which will be continued  May proceed with surgery

## 2018-09-07 NOTE — CASE MANAGEMENT
Initial Clinical Review    Admission: Date/Time/Statement: Observation 9/7 and changed to Inpatient on 9/7 @ 1551    Admitting Physician Therese Moctezuma    Level of Care Med Surg    Estimated length of stay More than 2 Midnights    Certification I certify that inpatient services are medically necessary for this patient for a duration of greater than two midnights  See H&P and MD Progress Notes for additional information about the patient's course of treatment  ED: Date/Time/Mode of Arrival:   ED Arrival Information     Expected Arrival Acuity Means of Arrival Escorted By Service Admission Type    - 9/6/2018 18:33 Urgent Ambulance [facility]ulance General Medicine Urgent    Arrival Complaint    Right Ankle Pain          Chief Complaint:   Chief Complaint   Patient presents with    Fall     Fall down 1 step, no LOC, denies hitting head, + deformity to right ankle, P/M/S intact       History of Illness: 68 y  o female status post fall from 1 step complaining of right ankle pain and inability to bear weight  The patient had a mechanical fall while walking out her front porch  She immediately felt pain, was unable to bear weight, and noticed a deformity of the ankle  She also reports hitting her head, but denies LOC or HA  ED Vital Signs:   ED Triage Vitals [09/06/18 1834]   Temperature Pulse Respirations Blood Pressure SpO2   97 9 °F (36 6 °C) 84 18 136/64 94 %      Temp Source Heart Rate Source Patient Position - Orthostatic VS BP Location FiO2 (%)   Oral Monitor Lying Right arm --      Pain Score       9        Wt Readings from Last 1 Encounters:   09/07/18 75 8 kg (167 lb)       Vital Signs (abnormal): WNL    Abnormal Labs:    09/07/18 0307    WBC 4 31 - 10 16 Thousand/uL 15 66       Calcium 8 3 - 10 1 mg/dL 7 9       Diagnostic Test Results: Xray Right Ankle -  Tibiofibular fractures in a Lauge Luz PER 3 pattern  Possible posterior malleolus fracture      ED Treatment:   Medication Administration from 09/06/2018 1833 to 09/07/2018 0027       Date/Time Order Dose Route Action     09/06/2018 1908 ondansetron (ZOFRAN) injection 4 mg 4 mg Intravenous Given     09/06/2018 1919 HYDROmorphone (DILAUDID) injection 0 5 mg 0 5 mg Intravenous Given     09/06/2018 2027 propofol (DIPRIVAN) 200 MG/20ML bolus injection 80 mg 60 mg Intravenous Given by Other     09/06/2018 2021 metoclopramide (REGLAN) injection 10 mg 10 mg Intravenous Given     09/06/2018 2229 lidocaine (PF) (XYLOCAINE-MPF) 1 % injection 30 mL 30 mL Infiltration Given by Other          Past Medical/Surgical History: Active Ambulatory Problems     Diagnosis Date Noted    No Active Ambulatory Problems     Resolved Ambulatory Problems     Diagnosis Date Noted    No Resolved Ambulatory Problems     Past Medical History:   Diagnosis Date    COPD (chronic obstructive pulmonary disease) ([facility] [address] )     Disease of thyroid gland     Hypertension        Admitting Diagnosis: Closed trimalleolar fracture of right ankle, initial encounter [S82 851A]  Closed fracture of right ankle, initial encounter [S82 891A]  Closed fracture of right tibia and fibula, initial encounter [S82 201A, S82 401A]  Unspecified multiple injuries, initial encounter [T07  XXXA]    Age/Sex: 68 y o  female    Assessment/Plan:   80y Female to ED S/P Fall with Right ankle fracture  NWB RLE  NPO - OR Today 9/7  Labs   Medicine cons for preop clearance    9/7 OR Today - OPEN REDUCTION W/ INTERNAL FIXATION (ORIF) ANKLE (Right Ankle)      Admission Orders:  NPO; Sips with meds  OR Today 9/7  Internal Medicine cons  Endocrinology cons    Scheduled Meds:   Current Facility-Administered Medications:  acetaminophen 975 mg Oral Q8H [facility]   calcium carbonate 1,000 mg Oral Once   docusate sodium 100 mg Oral BID   ipratropium 2 puff Inhalation Q6H   levothyroxine 50 mcg Oral Early Morning   losartan 50 mg Oral Daily   ondansetron 4 mg Intravenous Once   pravastatin 40 mg Oral Daily With NeuroSky senna 1 tablet Oral Daily     Continuous Infusions:    PRN Meds:   HYDROmorphone    oxyCODONE

## 2018-09-07 NOTE — H&P
Orthopedics   Lenora Kory Zamora 68 y o  female MRN: [id number]  Unit/Bed#: [facility] 924-01      Chief Complaint:   right ankle pain    HPI:  68 y  o female status post fall from 1 step complaining of right ankle pain and inability to bear weight  The patient had a mechanical fall while walking out her front porch  She immediately felt pain, was unable to bear weight, and noticed a deformity of the ankle  She also reports hitting her head, but denies LOC or HA  She denies any tingling, numbness, or pain in any other locations  Review Of Systems:   · Skin: Normal  · Neuro: See HPI  · Musculoskeletal: See HPI  · 14 point review of systems negative except as stated above     Past Medical History:   Past Medical History:   Diagnosis Date    COPD (chronic obstructive pulmonary disease) ([facility] [address] )     Disease of thyroid gland     Hypertension        Past Surgical History:   Past Surgical History:   Procedure Laterality Date    HYSTERECTOMY         Family History:  Family history reviewed and non-contributory  History reviewed  No pertinent family history  Social History:  Social History     Social History    Marital status: /Civil Union     Spouse name: N/A    Number of children: N/A    Years of education: N/A     Social History Main Topics    Smoking status: Never Smoker    Smokeless tobacco: Never Used    Alcohol use No    Drug use: No    Sexual activity: Not Asked     Other Topics Concern    None     Social History Narrative    None       Allergies:    Allergies   Allergen Reactions    Sulfa Antibiotics     Azithromycin Rash           Labs:  No results found for: HCT, HGB, PT, INR, WBC, ESR, CRP    Meds:    Current Facility-Administered Medications:     acetaminophen (TYLENOL) tablet 975 mg, 975 mg, Oral, Q8H [address], Mike Churchill MD    calcium carbonate (TUMS) chewable tablet 1,000 mg, 1,000 mg, Oral, Once, Mike Churchill MD, Stopped at 09/07/18 0227    docusate sodium (COLACE) capsule 100 mg, 100 mg, Oral, BID, Acacia Bach MD    HYDROmorphone (DILAUDID) injection 0 5 mg, 0 5 mg, Intravenous, Q1H PRN, Acacia Bach MD    ipratropium (ATROVENT HFA) inhaler 2 puff, 2 puff, Inhalation, Q6H, Acacia Bach MD    levothyroxine tablet 50 mcg, 50 mcg, Oral, Early Morning, Acacia Bach MD    ondansetron [facility]) injection 4 mg, 4 mg, Intravenous, Once, Acacia Bach MD, Stopped at 09/07/18 0227    oxyCODONE (ROXICODONE) immediate release tablet 10 mg, 10 mg, Oral, Q4H PRN, Acacia Bach MD    oxyCODONE (ROXICODONE) IR tablet 5 mg, 5 mg, Oral, Q4H PRN, Acacia Bach MD    pravastatin (PRAVACHOL) tablet 40 mg, 40 mg, Oral, Daily With Megan Cardona MD    [facility]) tablet 8 6 mg, 1 tablet, Oral, Daily, Acacia Bach MD    Blood Culture:   No results found for: BLOODCX    Wound Culture:   No results found for: WOUNDCULT    Ins and Outs:  I/O last 24 hours: In: 200 [I V :200]  Out: -           Physical Exam:   /65 (BP Location: Right arm)   Pulse 97   Temp 98 3 °F (36 8 °C) (Oral)   Resp 19   Ht 5' 5" (1 651 m)   Wt 75 8 kg (167 lb)   SpO2 93%   BMI 27 79 kg/m²   Gen: Alert and oriented to person, place, time  HEENT: EOMI, eyes clear, moist mucus membranes, hearing intact  Respiratory: Bilateral chest rise  No audible wheezing found  Cardiovascular: Regular Rate and Rhythm  Musculoskeletal: right lower extremity  · Small superficial abrasion over the medial aspect of the ankle  · Tender to palpation over medial and lateral malleoli  · Painful ankle range of motion  · 2+ DP  · Sensation intact L1-S1  · Positive knee flexion/extension, EHL/FHL      Radiology:   I personally reviewed the films  X-rays right ankle shows comminuted bimalleolar fracture with tibiotalar dislocation    Procedure: Ankle reduction and splint application    A hematoma block was given with 20cc on 1% lidocaine without epi   Once adequate anesthesia was obtained a gentle closed reduction maneuver was performed and pt was placed in a well padded AO splint  Pt tolerated the procedure well and was neurovascularly intact both pre and post procedure  Post reduction orthogonal x rays showed appropriate reduction of the talus in the ankle mortise      _*_*_*_*_*_*_*_*_*_*_*_*_*_*_*_*_*_*_*_*_*_*_*_*_*_*_*_*_*_*_*_*_*_*_*_*_*_*_*_*_*    Assessment:  68 y  o female status post mechanical fall with right ankle fracture    Plan:   · NWB right lower extremity in AO splint  · To OR for ORIF of right ankle fracture  · Analgesics for pain  · Informed consent obtained  · NPO at midnight  · Pre op labs in ED  · Medicine team for clearance to OR  · Dispo: Ortho will follow      Jefferson Gardner MD

## 2018-09-07 NOTE — PROGRESS NOTES
Subjective: 67 yo female with right ankle fracture with no complaints of pain last night  No issues currently and feels well      Objective:  Lab Results   Component Value Date/Time    WBC 15 66 (H) 09/07/2018 03:07 AM    HGB 12 8 09/07/2018 03:07 AM       Vitals:    09/07/18 0048   BP: 128/65   Pulse: 97   Resp: 19   Temp: 98 3 °F (36 8 °C)   SpO2: 93%     Right lower extremity  AO Splint c/d/i  Motor & sensation grossly intact distally  2+ DP Pulse with warm toes    Assessment: 67 yo female with right bimalleolar ankle fracture with lateral talar shift    Plan:  NWB RLE in AO Splint  To OR today for ORIF R Ankle  Pain control  DVT ppx-on hold for OR  PT after surgery

## 2018-09-07 NOTE — SOCIAL WORK
CM met with Pt and spouse Mian Munoz with an introduction and explanation of role  Pt reported residing with spouse in a 2 story home with a first floor set, no use of DME and 3 steps to enter  Pt reported being independent with ADLs with no hx of VNA, SNF, mental health or drug/alcohol placements  Pt reported having a living will, uses [facility] pharmacy on [facility] and has Dr Regina Smith as a PCP  CM discussed with Pt and spouse the recommendation of lovenox upon d/c and received their permission for a price check be completed by Amy Gomez with the assistance of their local pharmacy  CM faxed the Lovenox script to Amy Gomez for a price check  CM will continue to follow  CM reviewed d/c planning process including the following: identifying help at home, patient preference for d/c planning needs, Discharge Lounge, [facility] Meds to Bed program, availability of treatment team to discuss questions or concerns patient and/or family may have regarding understanding medications and recognizing signs and symptoms once discharged  CM also encouraged patient to follow up with all recommended appointments after discharge  Patient advised of importance for patient and family to participate in managing patients medical well being

## 2018-09-07 NOTE — PLAN OF CARE
DISCHARGE PLANNING     Discharge to home or other facility with appropriate resources Progressing        INFECTION - ADULT     Absence or prevention of progression during hospitalization Progressing     Absence of fever/infection during neutropenic period Progressing        Knowledge Deficit     Patient/family/caregiver demonstrates understanding of disease process, treatment plan, medications, and discharge instructions Progressing        MUSCULOSKELETAL - ADULT     Maintain or return mobility to safest level of function Progressing     Maintain proper alignment of affected body part Progressing        PAIN - ADULT     Verbalizes/displays adequate comfort level or baseline comfort level Progressing        Potential for Falls     Patient will remain free of falls Progressing        Prexisting or High Potential for Compromised Skin Integrity     Skin integrity is maintained or improved Progressing        SAFETY ADULT     Maintain or return to baseline ADL function Progressing     Maintain or return mobility status to optimal level Progressing

## 2018-09-07 NOTE — DISCHARGE INSTRUCTIONS
Discharge Instructions - Orthopedics  Valeria Seaman 68 y o  female MRN: [id number]  Unit/Bed#: Operating Room    Weight Bearing Status:                                           Nonweight bearing right lower extremity in splint    DVT prophylaxis:  Complete course of Lovenox as directed    Pain:  Continue analgesics as directed    Dressing Instructions:   Keep dressing and splint clean, dry and intact until follow up appointment  IF going to shower, must seal leg and splint with bag and/or keep out of shower  PT/OT:  No formal PT required until follow-up appointment  Appt Instructions: If you do not have your appointment, please call the clinic at [phone] to f/u with Dr Melissa Ashby in orthopaedic clinic in 2 weeks after surgery date  Contact the office sooner if you experience any increased numbness/tingling in the extremities  Follow up with Endocrine in 4-6 weeks  Call for appt once discharged from hospital       Prevention of Falls and Fractures  Safety first to prevent falls: At any age, people can change their environments to reduce their risk of falling and breaking a bone  Outdoor safety tips: In nasty weather, use a walker or cane for added stability  Wear warm boots with rubber soles for added traction  Look carefully at floor surfaces in public buildings  Many floors are made of highly polished marble or tile that can be very slippery  If floors have plastic or carpet runners in place, stay on them whenever possible  Identify community services that can provide assistance, such as 24-hour pharmacies and grocery stores that take orders over the phone and deliver  It is especially important to use these services in bad weather  Use a shoulder bag, lucy pack, or backpack to leave hands free  Stop at curbs and check their height before stepping up or down  Be cautious at curbs that have been cut away to allow access for bikes or wheelchairs   The incline up or down may lead to a fall  Indoor safety tips:  Keep all rooms free from clutter, especially the floors  Keep floor surfaces smooth but not slippery  When entering rooms, be aware of differences in floor levels and thresholds  Wear supportive, low-heeled shoes, even at home  Avoid walking around in socks, stockings, or floppy, backless slippers  Check that all carpets and area rugs have skid-proof backing or are tacked to the floor, including carpeting on stairs  Keep electrical and telephone cords and wires out of walkways  Be sure that all stairwells are adequately lit and that stairs have handrails on both sides  Consider placing fluorescent tape on the edges of the top and bottom steps  For optimal safety, install grab bars on bathroom walls beside tubs, showers, and toilets  If you are unstable on your feet, consider using a plastic chair with a back and nonskid leg tips in the shower  Use a rubber bath mat in the shower or tub  Keep a flashlight with fresh batteries beside your bed  Add ceiling fixtures to rooms lit by lamps only, or install lamps that can be turned on by a switch near the entry point into the room  Another option is to install voice- or sound-activated lamps  Use bright light bulbs in your home  If you must use a step-stool for hard-to-reach areas, use a sturdy one with a handrail and wide steps  A better option is to reorganize work and storage areas to minimize the need for stooping or excessive reaching  Consider purchasing a portable phone that you can take with you from room to room  It provides security because you can answer the phone without rushing for it and you can call for help should an accident occur  Dont let prescriptions run low  Always keep at least 1 weeks worth of medications on hand at home  Check prescriptions with your doctor and pharmacist to see if they may be increasing your risk of falling   If you take multiple medications, check with your doctor and pharmacist about possible interactions between the different medications  Arrange with a family member or friend for daily contact  Try to have at least one person who knows where you are  If you live alone, you may wish to contract with a monitoring company that will respond to your call 24 hours a day  Watch yourself in a mirror  Does your body lean or sway back and forth or side to side? People with decreased ability to balance often have a high degree of body sway and are more likely to fall  ([employer] au  [facility] gov/Health_Info/Bone/Osteoporosis/Fracture/prevent_falls  asp#d)      This patient has been enrolled in Own the Bone program (www ownthebone  org), which is a national post-fracture, systems-based, multidisciplinary fragility fracture prevention initiative program   In order to help with this initiative, please provide the appropriate counseling regarding the following prevention measures at the patient's next office visit:    Regular weight bearing and muscle strengthening  Fall Prevention  Smoking Cessation  Alcohol Consumption  Vitamin D supplementation  Calcium supplementation  Any additional pharmacotherapy recommendations

## 2018-09-07 NOTE — SOCIAL WORK
CM received a call from [address] [facility] [address] reporting the Pt has a copay of $205 26 for recommended lovenox  CM met with Pt and spouse and discussed their copay which they both reported being agreeable to  Pt is requesting to  her medication at her local pharmacy of [facility], and reported she will need teaching for the lovenox injections  Pt's spouse requested a SNF list for review  Krissy Andrade reported the Pt's physician reported the Pt will be requiring rehab upon d/c  CM provided the requested SNF list, and list and will continue to follow  Per Krissy Andrade request, CM followed up with [facility] to verify the previously reported lovenox copay of $205 26 was ran against the Pt's prescription plan an not his

## 2018-09-07 NOTE — ANESTHESIA POSTPROCEDURE EVALUATION
Post-Op Assessment Note      CV Status:  Stable    Mental Status:  Alert and awake    Hydration Status:  Euvolemic    PONV Controlled:  Controlled    Airway Patency:  Patent    Post Op Vitals Reviewed: Yes          Staff: Anesthesiologist, CRNA           /74 (09/07/18 1326)    Temp (!) 96 8 °F (36 °C) (09/07/18 1326)    Pulse 87 (09/07/18 1326)   Resp 15 (09/07/18 1326)    SpO2 99 % (09/07/18 1326)

## 2018-09-07 NOTE — ANESTHESIA PREPROCEDURE EVALUATION
Review of Systems/Medical History  Patient summary reviewed  Chart reviewed      Cardiovascular  EKG reviewed, Exercise tolerance (METS): >4,  Hyperlipidemia, Hypertension controlled,    Pulmonary  COPD mild- PRN medicaiton ,        GI/Hepatic            Endo/Other  History of thyroid disease , hypothyroidism,      GYN       Hematology   Musculoskeletal       Neurology   Psychology           Physical Exam    Airway    Mallampati score: III  TM Distance: >3 FB  Neck ROM: full     Dental       Cardiovascular  Rhythm: regular,     Pulmonary  Breath sounds clear to auscultation,     Other Findings  Glucose 65 - 140 mg/dL 116   Protime 11 8 - 14 2 seconds 13 1   INR 0 86 - 1 17 0 98   Hemoglobin 11 5 - 15 4 g/dL 12 8    Hematocrit 34 8 - 46 1 % 40 2     EKG  Normal sinus rhythm  Nonspecific ST abnormality  Abnormal ECG          Anesthesia Plan  ASA Score- 2     Anesthesia Type- general with ASA Monitors  Additional Monitors:   Airway Plan: LMA  Plan Factors-    Induction- intravenous  Postoperative Plan- Plan for postoperative opioid use  Informed Consent- Anesthetic plan and risks discussed with patient  I personally reviewed this patient with the CRNA  Discussed and agreed on the Anesthesia Plan with the CRNA  Daja Schaffer

## 2018-09-07 NOTE — ASSESSMENT & PLAN NOTE
Status post mechanical fall  Patient may proceed with surgery as she is moderate risk given history of COPD  She is independent and does most of her chores and skin proceed    Continue management as per primary team

## 2018-09-07 NOTE — CONSULTS
CONSULT Saurabh Banks 1945, 68 y o  female MRN: [id number]  Unit/Bed#: [facility] 924-01 Encounter: [id number]  Primary Care Provider: Anuradha Carlos MD   Date and time admitted to hospital: 9/6/2018  6:33 PM        * Closed fracture of right ankle   Assessment & Plan    Status post mechanical fall  Patient may proceed with surgery as she is moderate risk given history of COPD  She is independent and does most of her chores and skin proceed  Continue management as per primary team         COPD (chronic obstructive pulmonary disease) ([facility] [address] )   Assessment & Plan    History of COPD diagnosed 2 years ago not on home oxygen  She is only on Atrovent inhaler which will be continued  May proceed with surgery  Other hyperlipidemia   Assessment & Plan    Continue with Pravachol  Fasting lipid profile in a m  Other specified hypothyroidism   Assessment & Plan    Check TSH, free T4  Continue with levothyroxine  Leukocytosis   Assessment & Plan    Most likely stress related  Trend CBCD  Hypocalcemia  Assessment & Plan  Check PTH, vitamin-D levels  REASON FOR CONSULTATION:  PREOPERATIVE CLEARANCE, MEDICAL MANAGEMENT    HPI  Nury Banks is a pleasant 71-year-old lady with past medical history of COPD not on home oxygen, hypothyroidism, hypertension admitted under Orthopedic Services for right ankle pain secondary to right ankle fracture  Patient sustained a mechanical fall while walking to the [place]  We have been consulted for preoperative clearance, medical management  Patient has history of COPD diagnosed 2 years ago on Atrovent inhaler only  She is not on home oxygen  Other medical history is significant for hypothyroidism and hypertension hyperlipidemia  Blood pressure well control and will be continued on home dose of Avapro  Check TSH with free T4  Will check a fasting lipid profile in a m  EKG reviewed normal sinus rhythm   Patient is normally independent and does most of her chores by herself  She can walk a flight of stairs without getting winded  Patient is low to moderate cardiac risk for low to moderate risk surgery  Recommend patient proceed with surgical repair of right ankle fracture ( based on medical estimate of less than 4 Mets, RCRI risk score of 0 4%,)  Patient denies chest pain, PND, orthopnea, nausea, vomiting, diarrhea, constipation, blood in urine, fevers, chills, abdominal pain,dysuria, new onset weakness, slurred speech, seizure-like activity,dizziness, syncope, fall, trauma to the head, recent travel or recent sick contacts  PMH AND PSH  Past Medical History:   Diagnosis Date    COPD (chronic obstructive pulmonary disease) ([facility] [address] )     Disease of thyroid gland     Hypertension      Past Surgical History:   Procedure Laterality Date    HYSTERECTOMY         [facility]  · Reviewed, diabetes, hypertension runs in the family  CODE  · Full code    ROS  · Comprehensive 10 point review system conducted all negative except as mentioned in HPI  MEDS  Current Facility-Administered Medications:  acetaminophen 975 mg Oral [facility] Jefferson Gardner MD   calcium carbonate 1,000 mg Oral Once Jefferson Gardner MD   docusate sodium 100 mg Oral BID Jefferson Gardner MD   HYDROmorphone 0 5 mg Intravenous Q1H PRN Jefferson Gardner MD   ipratropium 2 puff Inhalation Q6H Jefferson Gardner MD   levothyroxine 50 mcg Oral Early Morning Jefferson Gardner MD   losartan 50 mg Oral Daily Avel Favre, MD   ondansetron 4 mg Intravenous Once Jefferson Gardner MD   oxyCODONE 10 mg Oral Q4H PRN Jefferson Gardner MD   oxyCODONE 5 mg Oral Q4H PRN Jefferson Gardner MD   pravastatin 40 mg Oral Daily With Nabeel Leal MD   senna 1 tablet Oral Daily Jefferson Gardner MD     Continuous Infusions:   PRN Meds:  HYDROmorphone    oxyCODONE    oxyCODONE    ALLERGIES  · Sulfa medications  · Azithromycin      PHYSICAL EXAMINATION  Vitals:    09/07/18 0753   BP: 122/56   Pulse: 91   Resp: 16 Temp: 98 4 °F (36 9 °C)   SpO2: 94%     GENERAL: AAO x 3, dry  HEENT: atraumatic, normocephalic  Oral mucosa moist, no icterus, pallor  PERRLA +  Neck supple, no JVD, no lymphadenopathy, no thryomegaly  CHEST: B/L breath sounds heard, occasional wheezing  CVS: S1, S2   ABDOMEN: Soft/obese/NT/BS heard  NEUROLOGICAL: CN II -XI grossly intact  No focal motor or sensory deficits  No signs of meningeal irritation or cerebellar dysfunction  EXTREMITIES:  Small superficial abrasion over medial aspect of right ankle, handed to palpation over medial and lateral malleoli  Range of motion is limited in the right ankle  No cyanosis clubbing or edema appreciated  LABS   9/7/2018 03:07 9/7/2018 03:24 9/7/2018 05:14   eGFR  89    Sodium  137    Potassium  4 1    Chloride  102    CO2  29    Anion Gap  6    BUN  12    Creatinine  0 65    Glucose  116    Calcium  7 9 (L)    WBC 15 66 (H)     RBC 4 08     Hemoglobin 12 8     HCT 40 2     MCV 99 (H)     MCH 31 4     MCHC 31 8     RDW 13 0     Platelets 060     MPV 9 8     Protime   13 1   INR   0 98   PTT   27   ABO Grouping  A    Rh Factor  Positive    Antibody Screen  Negative    Specimen Expiration Date  [id number]        We would like to thank primary team for allowing us to participate in the care of this patient  Will follow along while the patient is here  Time Spent for Care:  minutes   More than 35% of total time spent on counseling and coordination of care as described above  Gladys Robertson MD  HOSPITALIST SERVICES  9/7/2018    PLEASE NOTE:  This encounter was completed utilizing the [employer]/Fluency Direct Speech Voice Recognition Software  Grammatical errors, random word insertions, pronoun errors and incomplete sentences are occasional consequences of the system due to software limitations, ambient noise and hardware issues  These may be missed by proof reading prior to affixing electronic signature   Any questions or concerns about the content, text or information contained within the body of this dictation should be directly addressed to the physician for clarification  Please do not hesitate to call me directly if you have any any questions or concerns

## 2018-09-08 LAB
ANION GAP SERPL CALCULATED.3IONS-SCNC: 6 MMOL/L (ref 4–13)
BASOPHILS # BLD AUTO: 0.03 THOUSANDS/ΜL (ref 0–0.1)
BASOPHILS NFR BLD AUTO: 0 % (ref 0–1)
BUN SERPL-MCNC: 8 MG/DL (ref 5–25)
CALCIUM SERPL-MCNC: 8 MG/DL (ref 8.3–10.1)
CHLORIDE SERPL-SCNC: 101 MMOL/L (ref 100–108)
CHOLEST SERPL-MCNC: 132 MG/DL (ref 50–200)
CO2 SERPL-SCNC: 31 MMOL/L (ref 21–32)
CREAT SERPL-MCNC: 0.64 MG/DL (ref 0.6–1.3)
EOSINOPHIL # BLD AUTO: 0.01 THOUSAND/ΜL (ref 0–0.61)
EOSINOPHIL NFR BLD AUTO: 0 % (ref 0–6)
ERYTHROCYTE [DISTWIDTH] IN BLOOD BY AUTOMATED COUNT: 13.1 % (ref 11.6–15.1)
GFR SERPL CREATININE-BSD FRML MDRD: 89 ML/MIN/1.73SQ M
GLUCOSE SERPL-MCNC: 104 MG/DL (ref 65–140)
HCT VFR BLD AUTO: 40.2 % (ref 34.8–46.1)
HDLC SERPL-MCNC: 64 MG/DL (ref 40–60)
HGB BLD-MCNC: 12.7 G/DL (ref 11.5–15.4)
IMM GRANULOCYTES # BLD AUTO: 0.04 THOUSAND/UL (ref 0–0.2)
IMM GRANULOCYTES NFR BLD AUTO: 0 % (ref 0–2)
LDLC SERPL CALC-MCNC: 52 MG/DL (ref 0–100)
LYMPHOCYTES # BLD AUTO: 2.28 THOUSANDS/ΜL (ref 0.6–4.47)
LYMPHOCYTES NFR BLD AUTO: 21 % (ref 14–44)
MCH RBC QN AUTO: 31.8 PG (ref 26.8–34.3)
MCHC RBC AUTO-ENTMCNC: 31.6 G/DL (ref 31.4–37.4)
MCV RBC AUTO: 101 FL (ref 82–98)
MONOCYTES # BLD AUTO: 1.06 THOUSAND/ΜL (ref 0.17–1.22)
MONOCYTES NFR BLD AUTO: 10 % (ref 4–12)
NEUTROPHILS # BLD AUTO: 7.52 THOUSANDS/ΜL (ref 1.85–7.62)
NEUTS SEG NFR BLD AUTO: 69 % (ref 43–75)
NRBC BLD AUTO-RTO: 0 /100 WBCS
PLATELET # BLD AUTO: 283 THOUSANDS/UL (ref 149–390)
PMV BLD AUTO: 9.7 FL (ref 8.9–12.7)
POTASSIUM SERPL-SCNC: 3.7 MMOL/L (ref 3.5–5.3)
RBC # BLD AUTO: 4 MILLION/UL (ref 3.81–5.12)
SODIUM SERPL-SCNC: 138 MMOL/L (ref 136–145)
TRIGL SERPL-MCNC: 80 MG/DL
WBC # BLD AUTO: 10.94 THOUSAND/UL (ref 4.31–10.16)

## 2018-09-08 PROCEDURE — 80048 BASIC METABOLIC PNL TOTAL CA: CPT | Performed by: INTERNAL MEDICINE

## 2018-09-08 PROCEDURE — 99223 1ST HOSP IP/OBS HIGH 75: CPT | Performed by: INTERNAL MEDICINE

## 2018-09-08 PROCEDURE — 84166 PROTEIN E-PHORESIS/URINE/CSF: CPT | Performed by: INTERNAL MEDICINE

## 2018-09-08 PROCEDURE — 97166 OT EVAL MOD COMPLEX 45 MIN: CPT

## 2018-09-08 PROCEDURE — 97163 PT EVAL HIGH COMPLEX 45 MIN: CPT

## 2018-09-08 PROCEDURE — 99232 SBSQ HOSP IP/OBS MODERATE 35: CPT | Performed by: INTERNAL MEDICINE

## 2018-09-08 PROCEDURE — 99024 POSTOP FOLLOW-UP VISIT: CPT | Performed by: PHYSICIAN ASSISTANT

## 2018-09-08 PROCEDURE — 85025 COMPLETE CBC W/AUTO DIFF WBC: CPT | Performed by: INTERNAL MEDICINE

## 2018-09-08 PROCEDURE — G8988 SELF CARE GOAL STATUS: HCPCS

## 2018-09-08 PROCEDURE — G8987 SELF CARE CURRENT STATUS: HCPCS

## 2018-09-08 PROCEDURE — G8978 MOBILITY CURRENT STATUS: HCPCS

## 2018-09-08 PROCEDURE — 80061 LIPID PANEL: CPT | Performed by: INTERNAL MEDICINE

## 2018-09-08 PROCEDURE — G8979 MOBILITY GOAL STATUS: HCPCS

## 2018-09-08 RX ORDER — MELATONIN
5000 2 TIMES DAILY
Status: DISCONTINUED | OUTPATIENT
Start: 2018-09-08 | End: 2018-09-10 | Stop reason: HOSPADM

## 2018-09-08 RX ORDER — CALCIUM CARBONATE 200(500)MG
500 TABLET,CHEWABLE ORAL 2 TIMES DAILY
Status: DISCONTINUED | OUTPATIENT
Start: 2018-09-08 | End: 2018-09-10 | Stop reason: HOSPADM

## 2018-09-08 RX ORDER — B-COMPLEX WITH VITAMIN C
1 TABLET ORAL 2 TIMES DAILY WITH MEALS
Status: DISCONTINUED | OUTPATIENT
Start: 2018-09-08 | End: 2018-09-08

## 2018-09-08 RX ORDER — ECHINACEA PURPUREA EXTRACT 125 MG
1 TABLET ORAL EVERY 4 HOURS PRN
Status: DISCONTINUED | OUTPATIENT
Start: 2018-09-08 | End: 2018-09-10 | Stop reason: HOSPADM

## 2018-09-08 RX ADMIN — ONDANSETRON 4 MG: 2 INJECTION INTRAMUSCULAR; INTRAVENOUS at 15:38

## 2018-09-08 RX ADMIN — PRAVASTATIN SODIUM 40 MG: 40 TABLET ORAL at 18:06

## 2018-09-08 RX ADMIN — IPRATROPIUM BROMIDE 2 PUFF: 17 AEROSOL, METERED RESPIRATORY (INHALATION) at 03:31

## 2018-09-08 RX ADMIN — CEFAZOLIN SODIUM 2000 MG: 2 SOLUTION INTRAVENOUS at 05:24

## 2018-09-08 RX ADMIN — OXYCODONE HYDROCHLORIDE 10 MG: 10 TABLET ORAL at 11:26

## 2018-09-08 RX ADMIN — VITAMIN D, TAB 1000IU (100/BT) 5000 UNITS: 25 TAB at 20:11

## 2018-09-08 RX ADMIN — VITAMIN D, TAB 1000IU (100/BT) 5000 UNITS: 25 TAB at 15:15

## 2018-09-08 RX ADMIN — LOSARTAN POTASSIUM 50 MG: 50 TABLET ORAL at 08:17

## 2018-09-08 RX ADMIN — ACETAMINOPHEN 975 MG: 325 TABLET, FILM COATED ORAL at 20:12

## 2018-09-08 RX ADMIN — ENOXAPARIN SODIUM 40 MG: 40 INJECTION SUBCUTANEOUS at 08:16

## 2018-09-08 RX ADMIN — ACETAMINOPHEN 975 MG: 325 TABLET, FILM COATED ORAL at 15:10

## 2018-09-08 RX ADMIN — IPRATROPIUM BROMIDE 2 PUFF: 17 AEROSOL, METERED RESPIRATORY (INHALATION) at 20:13

## 2018-09-08 RX ADMIN — Medication 1 SPRAY: at 20:12

## 2018-09-08 RX ADMIN — SENNOSIDES 8.6 MG: 8.6 TABLET, FILM COATED ORAL at 08:17

## 2018-09-08 RX ADMIN — DOCUSATE SODIUM 100 MG: 100 CAPSULE, LIQUID FILLED ORAL at 08:17

## 2018-09-08 RX ADMIN — DOCUSATE SODIUM 100 MG: 100 CAPSULE, LIQUID FILLED ORAL at 18:06

## 2018-09-08 RX ADMIN — IPRATROPIUM BROMIDE 2 PUFF: 17 AEROSOL, METERED RESPIRATORY (INHALATION) at 15:11

## 2018-09-08 RX ADMIN — OXYCODONE HYDROCHLORIDE 10 MG: 10 TABLET ORAL at 20:12

## 2018-09-08 RX ADMIN — ANTACID TABLETS 500 MG: 500 TABLET, CHEWABLE ORAL at 15:15

## 2018-09-08 RX ADMIN — ANTACID TABLETS 500 MG: 500 TABLET, CHEWABLE ORAL at 20:11

## 2018-09-08 RX ADMIN — LEVOTHYROXINE SODIUM 50 MCG: 50 TABLET ORAL at 05:23

## 2018-09-08 RX ADMIN — ACETAMINOPHEN 975 MG: 325 TABLET, FILM COATED ORAL at 05:24

## 2018-09-08 NOTE — PROGRESS NOTES
Progress Note Kirsty Person 1945, 68 y o  female MRN: [id number]  Unit/Bed#: [facility] 924-01 Encounter: [id number]  Primary Care Provider: Corrinne Po, MD   Date and time admitted to hospital: 2018  6:33 PM        * Closed fracture of right ankle   Assessment & Plan    POD #1:Status post right Ankle ORIF  Status post mechanical fall  As per primary team         COPD (chronic obstructive pulmonary disease) ([facility] [place] [address] )   Assessment & Plan    History of COPD diagnosed 2 years ago not on home oxygen  She is only on Atrovent inhaler which will be continued  Not in acute exacerbation  Continue oxygen support as needed  Other hyperlipidemia   Assessment & Plan    Continue with Pravachol  Lipid panel reasonable  Other specified hypothyroidism   Assessment & Plan    TSH, free T4, reviewed  Patient is to continue with current dosage of levothyroxine  Leukocytosis   Assessment & Plan    Most likely stress related, WBC close to normalization  ____________________________________________________________________    SUBJECTIVE:   Patient seen and examined  She appears comfortable, pain over operated site  PTOT evaluation  Final disposition plan as per orthopedic services  OBJECTIVE:     Vitals:   HR:  [] 79  Resp:  [8-18] 16  BP: (114-149)/(55-74) 120/55  SpO2:  [94 %-99 %] 99 %  Temp (24hrs), Av °F (36 7 °C), Min:96 8 °F (36 °C), Max:98 7 °F (37 1 °C)  Current: Temperature: 98 6 °F (37 °C)    Intake/Output Summary (Last 24 hours) at 18 1107  Last data filed at 18 0900   Gross per 24 hour   Intake             1290 ml   Output             1025 ml   Net              265 ml       Physical Exam:   GENERAL: AAO x 3  HEENT: atraumatic, normocephalic  Oral mucosa moist, no icterus, pallor  PERRLA +  Neck supple, no JVD, no lymphadenopathy, no thryomegaly  CHEST: B/L breath sounds heard, occasional wheezing    CVS: S1, S2   ABDOMEN: Soft/obese/NT/BS heard   NEUROLOGICAL: CN II -XI grossly intact  No focal motor or sensory deficits  No signs of meningeal irritation or cerebellar dysfunction  EXTREMITIES:Right lower extremity -- Dressing clean dry intact,4/5 strength to EHL/FHL   Sensation intact L1-S1  +2 DP Pulse    LABS:   9/8/2018 05:04   eGFR 89   Sodium 138   Potassium 3 7   Chloride 101   CO2 31   Anion Gap 6   BUN 8   Creatinine 0 64   Glucose 104   Calcium 8 0 (L)   Cholesterol 132   Triglycerides 80   HDL 64 (H)   LDL Calculated 52   WBC 10 94 (H)   RBC 4 00   Hemoglobin 12 7   HCT 40 2    (H)   MCH 31 8   MCHC 31 6   RDW 13 1   Platelets 943   MPV 9 7   nRBC 0   Neutrophils Relative 69   Immat GRANS % 0   Lymphocytes Relative 21   Monocytes Relative 10   Eosinophils Relative 0   Basophils Relative 0   Immature Grans Absolute 0 04   Neutrophils Absolute 7 52   Lymphocytes Absolute 2 28   Monocytes Absolute 1 06   Eosinophils Absolute 0 01   Basophils Absolute 0 03     Scheduled Meds:    Current Facility-Administered Medications:  acetaminophen 975 mg Oral [facility] Rissa Cassidy MD   calcium carbonate 1,000 mg Oral Once Rissa Cassidy MD   calcium carbonate-vitamin D 1 tablet Oral BID With Meals David Reich MD   docusate sodium 100 mg Oral BID Rissa Cassidy MD   enoxaparin 40 mg Subcutaneous Q24H [facility] Samson   HYDROmorphone 0 5 mg Intravenous Q1H PRN Rissa Cassidy MD   ipratropium 2 puff Inhalation Q6H Rissa Cassidy MD   levothyroxine 50 mcg Oral Early Morning Rissa Cassidy MD   losartan 50 mg Oral Daily David Reich MD   ondansetron 4 mg Intravenous Once Rissa Cassidy MD   ondansetron 4 mg Intravenous Q6H PRN Rissa Cassidy MD   oxyCODONE 10 mg Oral Q4H PRN Rissa Cassidy MD   oxyCODONE 5 mg Oral Q4H PRN Rissa Cassidy MD   pravastatin 40 mg Oral Daily With Geneva Dixon MD   senna 1 tablet Oral Daily Rissa Cassidy MD       Continuous Infusions:       PRN Meds:  HYDROmorphone    ondansetron   oxyCODONE    oxyCODONE    VTE prophylaxis: Enoxaparin    Education and Discussions with Family / Patient: As per primary team     Current Length of Stay: Day 1     Current Patient Status: As per primary team     Certification Statement: As per primary team      Discharge Plan / Estimated Discharge Date:  Plan for today discussed with case management and RN  As per primary team      Code Status: Level 1 - Full Code    Time Spent for Care: 20 minutes   More than 50% of total time spent on counseling and coordination of care as described above  Matthieu Pavon MD  HOSPITALIST SERVICES  9/8/2018    PLEASE NOTE:  This encounter was completed utilizing the [employer] Direct Speech Voice Recognition Software  Grammatical errors, random word insertions, pronoun errors and incomplete sentences are occasional consequences of the system due to software limitations, ambient noise and hardware issues  These may be missed by proof reading prior to affixing electronic signature  Any questions or concerns about the content, text or information contained within the body of this dictation should be directly addressed to the physician for clarification  Please do not hesitate to call me directly if you have any any questions or concerns

## 2018-09-08 NOTE — RESTORATIVE TECHNICIAN NOTE
Restorative Specialist Mobility Note       Activity: Stand at bedside, Ambulate in room, Chair (Assisted PT, please refer to PT notes for all information )     Assistive Device: Front wheel walker     Ambulation Response: Tolerated well  Repositioned: Sitting, Up in chair      Patient left resting comfortably in bedside recliner, with call bell and table within reach

## 2018-09-08 NOTE — OCCUPATIONAL THERAPY NOTE
[address] Evaluation     Patient Name: Matthew Mcginnis  CEMHF'D Date: 9/8/2018  Problem List  Patient Active Problem List   Diagnosis    Closed fracture of right ankle    [facility]chronic obstructive pulmonary disease) ([facility] [address] )    Other hyperlipidemia    Other specified hypothyroidism    Leukocytosis     Past Medical History  Past Medical History:   Diagnosis Date    COPD [facility]chronic obstructive pulmonary disease) ([facility] [address] )     Disease of thyroid gland     Hypertension      Past Surgical History  Past Surgical History:   Procedure Laterality Date    HYSTERECTOMY           09/08/18 1200   Note Type   Note type Eval/Treat   Restrictions/Precautions   Weight Bearing Precautions Per Order Yes   RLE Weight Bearing Per Order NWB   Other Precautions WBS; Fall Risk;Pain   Pain Assessment   Pain Assessment 0-10   Pain Score 8   Pain Type Acute pain   Pain Location Ankle   Pain Orientation Right   Hospital Pain Intervention(s) Ambulation/increased activity; Emotional support;Elevated;Cold applied;Repositioned   Home Living   Type of [address] One level;Stairs to enter with rails   Prior Function   Level of [place] Independent with ADLs and functional mobility   Lives With Spouse   Receives Help From Family;Friend(s)   ADL Assistance Independent   IADLs Independent   Falls in the last 6 months 1 to 4   Vocational Retired   Lifestyle   Autonomy i adls and mobility - i iadls - shares homemaking with spouse   Reciprocal Relationships supportive family and friends however reports spouse recently had hernia sx and cannot provide physical assist   Service to Others retired   [address] "i don't know how i'm going to do this at home"   ADL   [place] 5  [address] 5  [address] 3  Moderate Assistance   [address] 5  Supervision/Setup LB Dressing Assistance 3  Moderate Assistance   Toileting Assistance  3  Moderate Assistance   Bed Mobility   Supine to Sit 3  Moderate assistance   Transfers   Sit to Stand 4  Minimal assistance   Stand to Sit 4  Minimal assistance   Stand pivot 4  Minimal assistance   Balance   Static Sitting Fair +   Dynamic Sitting Fair   Static Standing Fair -   Dynamic Standing Fair -   Ambulatory Poor +   Activity Tolerance   Activity Tolerance Patient limited by fatigue;Patient limited by pain   RUE Assessment   RUE Assessment WFL   LUE Assessment   LUE Assessment WFL   Cognition   Overall Cognitive Status WFL   Assessment   Limitation Decreased ADL status; Decreased Safe judgement during ADL;Decreased endurance;Decreased self-care trans;Decreased high-level ADLs   Prognosis Good   Assessment Pt is a 68 y o  female who was [facility] on 9/6/2018 with Closed fracture of right ankle2* fall s/p ORIF   Pt's problem list also includes PMH of HTN, COPD and hypothyroid,   At baseline pt was completing adls and mobility independently  Pt lives with spouse in 2 story home with first floor setup - 3 [address]  Currently pt requires moderate assist for overall ADLS and min assist for functional mobility/transfers  Pt currently presents with impairments in the following categories -steps to enter environment, limited home support, difficulty performing ADLS, difficulty performing IADLS  and health management  activity tolerance, endurance, standing balance/tolerance and safety    These impairments, as well as pt's fatigue, pain, orthopedic restricitions , WBS , decreased caregiver support and risk for falls  limit pt's ability to safely engage in all baseline areas of occupation, includingbathing, dressing, toileting, functional mobility/transfers, community mobility, house maintenance, meal prep, cleaning, social participation  and leisure activities  From OT standpoint, recommend home with family support pending progress - if pt continue to require physical assist may need to consider STR as spouse is unable to provide assist upon D/C  OT will continue to follow to address the below stated goals  Goals   Patient Goals go home   LTG Time Frame 7-10   Long Term Goal #1 refer to established goals below   Plan   Treatment Interventions ADL retraining;Functional transfer training; Endurance training;Patient/family training;Equipment evaluation/education; Compensatory technique education; Activityengagement   Goal Expiration Date 09/18/18   OT Frequency 3-5x/wk   Recommendation   OT Discharge Recommendation Home with family support   Barthel Index   Feeding 10   Bathing 0   Grooming Sc  ore 5   Dressing Score 5   Bladder Score 10   Bowels Score 10   Toilet Use Score 5   Transfers (Bed/Chair) Score 10   Mobility (Level Surface) Score 0   Stairs Score 0   Barthel Index Score 55         OCCUPATIONAL THERAPY GOALS:    *MOD I ADLS AFTER SETUP WITH USE OF ADAPTIVE DEVICES PRN  *MOD I TOILETING AND CLOTHING MANAGEMENT   *MOD I FUNCTIONAL MOB AND TRANSFERS TO ALL SURFACES WITH FAIR+ TO GOOD BALANCE/SAFETY FOR PARTICIPATION IN DYNAMIC ADLS   *DEMONSTRATE GOOD CARRYOVER WITH SAFE USE OF RW AND NWB LLE DURING FUNCTIONAL TASKS  *ASSESS DME NEEDS  *INCREASE ACTIVITY TOLERANCE TO 35-40 MIN FOR PARTICIPATION IN ADLS AND ENJOYABLE ACTIVITIES     * ASSIST WITH SAFE D/C RECOMMENDATIONS     [place], OT

## 2018-09-08 NOTE — PHYSICAL THERAPY NOTE
Physical Therapy Evaluation    Patient's Name:Jovita Zamora    Today's Date:09/08/18    Patient Active Problem List   Diagnosis    Closed fracture of right ankle    [facility]    Other hyperlipidemia    Other specified hypothyroidism    Leukocytosis       Past Medical History:   Diagnosis Date    [facility]) [facility] [address] [facility]     Disease of thyroid gland     Hypertension        Past Surgical History:   Procedure Laterality Date    HYSTERECTOMY          09/08/18 1505   Pain Assessment   Pain Assessment 0-10   Pain Score Worst Possible Pain   Pain Type Acute pain   Pain Location Leg   Pain Orientation Right   Hospital Pain Intervention(s) Ambulation/increased activity   Response to Interventions increaesd pain w dep positioning   Home Living   Type of [address] [place] [address] One level; Able to live on main level with bedroom/bathroom  (3 curb steps to access)   Prior Function   Level of [place] Independent with ADLs and functional mobility   Lives With Spouse   Receives Help From Family;Friend(s)   Restrictions/Precautions   RLE Weight Bearing Per Order NWB   Other Precautions WBS; Fall Risk;Pain   General   Family/Caregiver Present No   Cognition   Arousal/Participation Alert   Orientation Level Oriented X4   Memory Within functional limits   Following Commands Follows all commands and directions without difficulty   RUE Assessment   RUE Assessment WFL   LUE Assessment   LUE Assessment WFL   RLE Assessment   RLE Assessment X   Strength RLE   RLE Overall Strength (hip fl 3-/5 )   LLE Assessment   LLE Assessment X   Strength LLE   LLE Overall Strength 4-/5   Coordination   Movements are Fluid and Coordinated 0   Coordination and Movement Description antalgic LE instabilty   Bed Mobility   Additional Comments bed mob NT- pt in chair on PT arrival   Transfers   Sit to Stand 4  Minimal assistance   Additional items Increased time required;Verbal cues   Stand to Sit 4  Minimal assistance   Additional items Increased time required;Verbal cues   Stand pivot 4  Minimal assistance   Additional items Increased time required;Verbal cues  (RW)   Ambulation/Elevation   Gait pattern Improper Weight shift  (RLE NWB)   Gait Assistance 4  Minimal assist   Additional items Verbal cues; Tactile cues   Assistive Device Rolling walker   Distance 4 ft forwards 4 ft backwards   Balance   Static Standing Fair  (RW)   Dynamic Standing Poor +  (RW)   Endurance Deficit   Endurance Deficit Yes   Endurance Deficit Description antalgic LE instability   Activity Tolerance   Activity Tolerance Patient limited by pain   Nurse Made Aware yes   Assessment   Prognosis Good   Problem List Decreased strength;Decreased range of motion;Decreased endurance; Impaired balance;Decreased mobility; Decreased coordination; Impaired judgement;Decreased safety awareness;Pain;Orthopedic restrictions;Decreased skin integrity   Assessment Pt is a 68 y o  female admitted to [facility] on 9/6/2018 w/ Closed fracture of right ankle; is s/p ORIF and NWB RLE Pt exhibits significant impairments with weakness, decreased ROM, impaired balance, decreased endurance, impaired coordination, gait deviations, pain, decreased activity tolerance, decreased functional mobility tolerance, decreased safety awareness, impaired judgement, fall risk, orthopedic restrictions and decreased skin integrity; these impact independence with mobility, ADLs, and IADLs; requires min assist w transf and amb 4 ft forward then backward using RW- able tomaintain RLE NWB, gait pattern antalgic decreased foot clearance ; objective measures on the Barthel Index also reveal limitations;  therapy prognosis is impacted by relevant co morbidities as noted in evaluation; clinical presentation is currently unstable/unpredictable - pt is on cont pulse oximetry, neurovasc checks, pain  Inadequately controlled, presents w phys impairments as noted- a regression from baseline; PTA, pt was Independent with mobility lives in single level setup 3 curb steps to access skilled PT is indicated to to optimize functional independence and discharge planning; pending functional progress, PT recommendation at discharge is IP rehab vs home with assist for mob depending on funct progress and assist available at time of discharge will need RW for household amb and wc w elev leg rest for indep w household ADLs and community locomotion   Goals   Patient Goals go home   STG Expiration Date 09/18/18   Short Term Goal #1  1  Independent with Bed Mobility Rolling Right and Left     2  Modified Independent with Bed Mobility Supine-Sit     3  Modified Independent with Transfer Bed-Chair     4  Increase Dynamic Sitting Balance at least 1 Grade for improved stability with functional reach activities     5  Increase Dynamic Standing Balance at least 1 Grade for improved ease with Activities of Daily Living     6  Increase Lower Extremity Strength at least 1 Grade for improved ease mobility tasks     7  Modified Independent with  Ambulation 50 feet using RW RLE NWB to facilitate home and community mobility 8  Modified Independent with Ascending/Descending 3 curb  steps to facilitate home and community accessibility  9  Independent w wc mob 300 ft   Plan   Treatment/Interventions Functional transfer training;LE strengthening/ROM; Elevations; Therapeutic exercise; Endurance training;Cognitive reorientation;Patient/family training;Equipment eval/education; Bed mobility;Gait training; Compensatory technique education;Spoke to nursing;Spoke to case management   PT Frequency (3-6x/wk)   Recommendation   Recommendation (IP rehab v home w assist dep on progress)   Equipment Recommended Walker; Wheelchair   Barthel Index   Feeding 10   Bathing 0   Grooming Score 5   Dressing Score 5   Bladder Score 10   Bowels Score 10   Toilet Use Score 5   Transfers (Bed/Chair) Score 10   Mobility (Level Surface) Score 0   Stairs Score 0   Barthel Index Score 55

## 2018-09-08 NOTE — ASSESSMENT & PLAN NOTE
History of COPD diagnosed 2 years ago not on home oxygen  She is only on Atrovent inhaler which will be continued  Not in acute exacerbation  Continue oxygen support as needed

## 2018-09-08 NOTE — PROGRESS NOTES
Orthopedics   Milly Zamora 68 y o  female MRN: [id number]  [facility]    Subjective:  68 y  o female post operative day 1 status post right Ankle ORIF  Pt doing well  Pain controlled      Labs:    0  Lab Value Date/Time   HCT 40 2 09/08/2018 0504   HCT 40 2 09/07/2018 0307   HGB 12 7 09/08/2018 0504   HGB 12 8 09/07/2018 0307   INR 0 98 09/07/2018 0514   WBC 10 94 (H) 09/08/2018 0504   WBC 15 66 (H) 09/07/2018 0307   ESR 38 (H) 09/07/2018 1440       Meds:    Current Facility-Administered Medications:     acetaminophen (TYLENOL) tablet 975 mg, 975 mg, Oral, Q8H [address], Moraima Leo MD, 975 mg at 09/08/18 0524    calcium carbonate (TUMS) chewable tablet 1,000 mg, 1,000 mg, Oral, Once, Moraima Leo MD, Stopped at 09/07/18 0227    docusate sodium (COLACE) capsule 100 mg, 100 mg, Oral, BID, Moraima Leo MD, 100 mg at 09/08/18 0817    enoxaparin (LOVENOX) subcutaneous injection 40 mg, 40 mg, Subcutaneous, Q24H [address], Melodye Repress, 40 mg at 09/08/18 0816    HYDROmorphone (DILAUDID) injection 0 5 mg, 0 5 mg, Intravenous, Q1H PRN, Moraima Leo MD, 0 5 mg at 09/07/18 1625    ipratropium (ATROVENT HFA) inhaler 2 puff, 2 puff, Inhalation, Q6H, Moraima Leo MD, 2 puff at 09/08/18 0331    levothyroxine tablet 50 mcg, 50 mcg, Oral, Early Morning, Moraima Leo MD, 50 mcg at 09/08/18 0523    losartan (COZAAR) tablet 50 mg, 50 mg, Oral, Daily, Nolan Valdez MD, 50 mg at 09/08/18 0817    ondansetron (ZOFRAN) injection 4 mg, 4 mg, Intravenous, Once, Moraima Leo MD, Stopped at 09/07/18 0227    ondansetron (ZOFRAN) injection 4 mg, 4 mg, Intravenous, Q6H PRN, Moraima Leo MD    oxyCODONE (ROXICODONE) immediate release tablet 10 mg, 10 mg, Oral, Q4H PRN, Moraima Leo MD    oxyCODONE (ROXICODONE) IR tablet 5 mg, 5 mg, Oral, Q4H PRN, Moraima Leo MD    pravastatin (PRAVACHOL) tablet 40 mg, 40 mg, Oral, Daily With Katerin Schuster MD    senna (SENOKOT) tablet 8 6 mg, 1 tablet, Oral, Daily, Naheed Mora MD, 8 6 mg at 09/08/18 0817    Blood Culture:   No results found for: BLOODCX    Wound Culture:   No results found for: WOUNDCULT    Ins and Outs:  I/O last 24 hours: In: 1050 [P O :300; I V :750]  Out: 675 [Urine:600; Blood:75]          Physical:  Vitals:    09/08/18 0704   BP: 120/55   Pulse: 79   Resp: 16   Temp: 98 6 °F (37 °C)   SpO2: 99%     right lower extremityORIF  · Dressings clean Dry Intact  · 4/5 strength to EHL/FHL  · Sensation intact L1-S1  · +2 DP Pulse    _*_*_*_*_*_*_*_*_*_*_*_*_*_*_*_*_*_*_*_*_*_*_*_*_*_*_*_*_*_*_*_*_*_*_*_*_*_*_*_*_*    Assessment:   Post operative day 1 status post right Ankle ORIF  Doing well      Plan:  · Nonweight bearing right lower extremity  · Up out of bed  · Ice, Elevation to affected extremity  · Analgesics  · DVT prophylaxis  · Dispo: Ortho will follow  · Will continue to assess for acute blood loss anemia   · PT/OT  · DC to rehab,  on board    Lisa Fernandez PA-C

## 2018-09-08 NOTE — PLAN OF CARE
Problem: PHYSICAL THERAPY ADULT  Goal: Performs mobility at highest level of function for planned discharge setting  See evaluation for individualized goals  Treatment/Interventions: Functional transfer training, LE strengthening/ROM, Elevations, Therapeutic exercise, Endurance training, Cognitive reorientation, Patient/family training, Equipment eval/education, Bed mobility, Gait training, Compensatory technique education, Spoke to nursing, Spoke to case management  Equipment Recommended: Phuong Flash, Wheelchair       See flowsheet documentation for full assessment, interventions and recommendations    Prognosis: Good  Problem List: Decreased strength, Decreased range of motion, Decreased endurance, Impaired balance, Decreased mobility, Decreased coordination, Impaired judgement, Decreased safety awareness, Pain, Orthopedic restrictions, Decreased skin integrity  Assessment: Pt is a 68 y o  female admitted to [facility] on 9/6/2018 w/ Closed fracture of right ankle; is s/p ORIF and NWB RLE Pt exhibits significant impairments with weakness, decreased ROM, impaired balance, decreased endurance, impaired coordination, gait deviations, pain, decreased activity tolerance, decreased functional mobility tolerance, decreased safety awareness, impaired judgement, fall risk, orthopedic restrictions and decreased skin integrity; these impact independence with mobility, ADLs, and IADLs; requires min assist w transf and amb 4 ft forward then backward using RW- able tomaintain RLE NWB, gait pattern antalgic decreased foot clearance ; objective measures on the Barthel Index also reveal limitations;  therapy prognosis is impacted by relevant co morbidities as noted in evaluation; clinical presentation is currently unstable/unpredictable - pt is on cont pulse oximetry, neurovasc checks, pain  Inadequately controlled, presents w phys impairments as noted- a regression from baseline; PTA, pt was Independent with mobility lives in single level setup 3 curb steps to access skilled PT is indicated to to optimize functional independence and discharge planning; pending functional progress, PT recommendation at discharge is IP rehab vs home with assist for mob depending on funct progress and assist available at time of discharge will need RW for household amb and wc w elev leg rest for indep w household ADLs and community locomotion        Recommendation:  (IP rehab v home w assist dep on progress)          See flowsheet documentation for full assessment

## 2018-09-08 NOTE — CONSULTS
Consultation - Bennie Aguirre 68 y o  female MRN: [id number]    [facility] Encounter: [id number]      Assessment/Plan     Assessment: This is a 68y o -year-old female with osteoporosis, vitamin-D deficiency, secondary hyperparathyroidism and right ankle fracture  Plan:  1  Severe osteoporosis as evident by right ankle fracture-she has been on bisphosphonates for at least four years and despite this, she did have a fracture  Therefore, a change in therapy should be considered  Check SPEP and UPEP to rule out multiple myeloma  She has had a recent DEXA scan but we do not have access to the report  Start calcium 500 mg twice a day  2   Vitamin-D deficiency-increased vitamin-D replacement to 5000 units twice a day  3   Secondary hyperparathyroidism may be due to vitamin-D deficiency as well as low calcium intake  Increase calcium supplementation two twice a day  4   Right ankle fracture has been repaired surgically  Inpatient consult to Endocrinology  Consult performed by: Gunner Wade ordered by: Karishma Akers          CC:  Osteoporosis and own the bone Program    History of Present Illness     HPI: Bennie Aguirre is a 68y o  year old female with known history of osteoporosis who sustained a fall which led to right ankle fracture  This has been surgically repaired  She has a history of osteoporosis for at least four years and has been on bisphosphonates  She denies any reflux symptoms  There is no family history of osteoporosis  She does not really eat or drink dairy products  She does take 2000 units of vitamin-D per day  She also takes 1 TUMS per day  Review of Systems   Constitutional: Negative for chills and fever  Respiratory: Negative for shortness of breath  Cardiovascular: Negative for chest pain  Gastrointestinal: Negative for constipation, diarrhea, nausea and vomiting  Endocrine: Negative for cold intolerance and heat intolerance  All other systems reviewed and are negative  Historical Information   Past Medical History:   Diagnosis Date    COPD (chronic obstructive pulmonary disease) ([facility] [address] )     Disease of thyroid gland     Hypertension      Past Surgical History:   Procedure Laterality Date    HYSTERECTOMY       Social History   History   Alcohol Use No     History   Drug Use No     History   Smoking Status    Never Smoker   Smokeless Tobacco    Never Used     Family History: History reviewed  No pertinent family history      Meds/Allergies   Current Facility-Administered Medications   Medication Dose Route Frequency Provider Last Rate Last Dose    acetaminophen (TYLENOL) tablet 975 mg  975 mg Oral [facility] Radha Baer MD   975 mg at 09/08/18 0524    calcium carbonate (TUMS) chewable tablet 1,000 mg  1,000 mg Oral Once Radha Baer MD   Stopped at 09/07/18 0227    calcium carbonate-vitamin D (OSCAL-D) 500 mg-200 units per tablet 1 tablet  1 tablet Oral BID With Meals Tayla Nielson MD        docusate sodium (COLACE) capsule 100 mg  100 mg Oral BID Radha Baer MD   100 mg at 09/08/18 0817    enoxaparin (LOVENOX) subcutaneous injection 40 mg  40 mg Subcutaneous Q24H [address] Guillermo Ramski   40 mg at 09/08/18 0816    HYDROmorphone (DILAUDID) injection 0 5 mg  0 5 mg Intravenous Q1H PRN Radha Baer MD   0 5 mg at 09/07/18 1625    ipratropium (ATROVENT HFA) inhaler 2 puff  2 puff Inhalation Q6H Radha Baer MD   2 puff at 09/08/18 0331    levothyroxine tablet 50 mcg  50 mcg Oral Early Morning Radha Baer MD   50 mcg at 09/08/18 0523    losartan (COZAAR) tablet 50 mg  50 mg Oral Daily Tayla Nielson MD   50 mg at 09/08/18 0817    ondansetron (ZOFRAN) injection 4 mg  4 mg Intravenous Once Radha Baer MD   Stopped at 09/07/18 0227    ondansetron (ZOFRAN) injection 4 mg  4 mg Intravenous Q6H PRN Radha Baer MD        oxyCODONE (ROXICODONE) immediate release tablet 10 mg  10 mg Oral Q4H PRN Kevin Mon Mina Reddy MD   10 mg at 09/08/18 1126    oxyCODONE (ROXICODONE) IR tablet 5 mg  5 mg Oral Q4H PRN Radha Baer MD        pravastatin (PRAVACHOL) tablet 40 mg  40 mg Oral Daily With Conner Espinoza MD        [facility]) tablet 8 6 mg  1 tablet Oral Daily Radha Baer MD   8 6 mg at 09/08/18 9645     Allergies   Allergen Reactions    Sulfa Antibiotics     Azithromycin Rash       Objective   Vitals: Blood pressure 120/55, pulse 79, temperature 98 6 °F (37 °C), temperature source Oral, resp  rate 16, height 5' 5" (1 651 m), weight 75 8 kg (167 lb), SpO2 98 %  Intake/Output Summary (Last 24 hours) at 09/08/18 1202  Last data filed at 09/08/18 0900   Gross per 24 hour   Intake             1290 ml   Output             1025 ml   Net              265 ml     Invasive Devices     Peripheral Intravenous Line            Peripheral IV 09/07/18 Right Forearm less than 1 day                Physical Exam   Constitutional: She is oriented to person, place, and time  She appears well-developed and well-nourished  No distress  HENT:   Head: Normocephalic and atraumatic  Mouth/Throat: Oropharynx is clear and moist and mucous membranes are normal  No oropharyngeal exudate  Eyes: Conjunctivae, EOM and lids are normal  Right eye exhibits no discharge  Left eye exhibits no discharge  No scleral icterus  Neck: Neck supple  No thyromegaly present  Cardiovascular: Normal rate, regular rhythm and normal heart sounds  Exam reveals no gallop and no friction rub  No murmur heard  Pulmonary/Chest: Effort normal and breath sounds normal  No respiratory distress  She has no wheezes  Abdominal: Soft  Bowel sounds are normal  She exhibits no distension  There is no tenderness  Musculoskeletal: Normal range of motion  She exhibits no edema, tenderness or deformity  Lymphadenopathy:        Head (right side): No occipital adenopathy present  Head (left side): No occipital adenopathy present  Right: No supraclavicular adenopathy present  Left: No supraclavicular adenopathy present  Neurological: She is alert and oriented to person, place, and time  No cranial nerve deficit  Skin: Skin is warm and intact  No rash noted  She is not diaphoretic  No erythema  Psychiatric: She has a normal mood and affect  Her behavior is normal    Vitals reviewed  The history was obtained from the review of the chart, patient  Lab Results:       Lab Results   Component Value Date    WBC 10 94 (H) 09/08/2018    HGB 12 7 09/08/2018    HCT 40 2 09/08/2018     (H) 09/08/2018     09/08/2018     Lab Results   Component Value Date/Time    BUN 8 09/08/2018 05:04 AM     09/08/2018 05:04 AM    K 3 7 09/08/2018 05:04 AM     09/08/2018 05:04 AM    CO2 31 09/08/2018 05:04 AM    CREATININE 0 64 09/08/2018 05:04 AM    AST 18 09/07/2018 02:40 PM    ALT 25 09/07/2018 02:40 PM    ALB 3 4 (L) 09/07/2018 02:40 PM     Recent Labs      09/08/18   0504   HDL  64*   TRIG  80       Imaging Studies: I have personally reviewed pertinent reports  Portions of the record may have been created with voice recognition software

## 2018-09-09 PROBLEM — E55.9 VITAMIN D DEFICIENCY: Status: ACTIVE | Noted: 2018-09-09

## 2018-09-09 PROBLEM — M81.0 AGE RELATED OSTEOPOROSIS: Status: ACTIVE | Noted: 2018-09-09

## 2018-09-09 PROCEDURE — 99024 POSTOP FOLLOW-UP VISIT: CPT | Performed by: PHYSICIAN ASSISTANT

## 2018-09-09 PROCEDURE — 99232 SBSQ HOSP IP/OBS MODERATE 35: CPT | Performed by: INTERNAL MEDICINE

## 2018-09-09 PROCEDURE — 97116 GAIT TRAINING THERAPY: CPT

## 2018-09-09 PROCEDURE — 97530 THERAPEUTIC ACTIVITIES: CPT

## 2018-09-09 PROCEDURE — 97110 THERAPEUTIC EXERCISES: CPT

## 2018-09-09 RX ADMIN — VITAMIN D, TAB 1000IU (100/BT) 5000 UNITS: 25 TAB at 17:04

## 2018-09-09 RX ADMIN — ANTACID TABLETS 500 MG: 500 TABLET, CHEWABLE ORAL at 09:06

## 2018-09-09 RX ADMIN — ACETAMINOPHEN 975 MG: 325 TABLET, FILM COATED ORAL at 05:43

## 2018-09-09 RX ADMIN — ENOXAPARIN SODIUM 40 MG: 40 INJECTION SUBCUTANEOUS at 09:06

## 2018-09-09 RX ADMIN — DOCUSATE SODIUM 100 MG: 100 CAPSULE, LIQUID FILLED ORAL at 17:03

## 2018-09-09 RX ADMIN — DOCUSATE SODIUM 100 MG: 100 CAPSULE, LIQUID FILLED ORAL at 09:06

## 2018-09-09 RX ADMIN — LOSARTAN POTASSIUM 50 MG: 50 TABLET ORAL at 09:09

## 2018-09-09 RX ADMIN — IPRATROPIUM BROMIDE 2 PUFF: 17 AEROSOL, METERED RESPIRATORY (INHALATION) at 16:59

## 2018-09-09 RX ADMIN — VITAMIN D, TAB 1000IU (100/BT) 5000 UNITS: 25 TAB at 09:03

## 2018-09-09 RX ADMIN — ACETAMINOPHEN 975 MG: 325 TABLET, FILM COATED ORAL at 17:04

## 2018-09-09 RX ADMIN — ACETAMINOPHEN 975 MG: 325 TABLET, FILM COATED ORAL at 22:10

## 2018-09-09 RX ADMIN — ONDANSETRON 4 MG: 2 INJECTION INTRAMUSCULAR; INTRAVENOUS at 11:12

## 2018-09-09 RX ADMIN — SENNOSIDES 8.6 MG: 8.6 TABLET, FILM COATED ORAL at 09:04

## 2018-09-09 RX ADMIN — IPRATROPIUM BROMIDE 2 PUFF: 17 AEROSOL, METERED RESPIRATORY (INHALATION) at 23:08

## 2018-09-09 RX ADMIN — OXYCODONE HYDROCHLORIDE 5 MG: 5 TABLET ORAL at 09:05

## 2018-09-09 RX ADMIN — PRAVASTATIN SODIUM 40 MG: 40 TABLET ORAL at 17:04

## 2018-09-09 RX ADMIN — LEVOTHYROXINE SODIUM 50 MCG: 50 TABLET ORAL at 05:43

## 2018-09-09 RX ADMIN — ANTACID TABLETS 500 MG: 500 TABLET, CHEWABLE ORAL at 18:57

## 2018-09-09 NOTE — SOCIAL WORK
CM met with Pt to discuss the recommendation of SNF and inquired about Pt's selection of choices from the SNF list given on 9/7  Pt reported her spouse is still reviewing the list and they will follow up with CM with the selection of their SNF choices  CM reviewed the need and importance of having a d/c plan in place prior to her d/c readiness, to avoid any delay in her rehab process  CM provided Pt with CM contact information for a  follow up with SNF choices

## 2018-09-09 NOTE — ASSESSMENT & PLAN NOTE
Check SPEP and UPEP to rule out multiple myeloma  She has had a recent DEXA scan but we do not have access to the report  Start calcium 500 mg twice a day

## 2018-09-09 NOTE — PHYSICAL THERAPY NOTE
Physical Therapy Progress Note        09/09/18 0934   Pain Assessment   Pain Assessment 0-10   Pain Score 4   Pain Type Acute pain  (Reports pain in L ankle with WB)   Pain Location Back   Hospital Pain Intervention(s) Ambulation/increased activity   Response to Interventions Improving    Restrictions/Precautions   Weight Bearing Precautions Per Order Yes   RLE Weight Bearing Per Order NWB   Other Precautions WBS;O2;Fall Risk;Pain   General   Chart Reviewed Yes   Response to Previous Treatment Patient with no complaints from previous session  Family/Caregiver Present No   Cognition   Overall Cognitive Status WFL   Comments Patient is pleasant and cooperative throughout session   Transfers   Sit to Stand 4  Minimal assistance   Additional items Assist x 1; Increased time required;Verbal cues   Stand to Sit 4  Minimal assistance   Additional items Assist x 1; Increased time required;Verbal cues   Stand pivot 4  Minimal assistance   Additional items Assist x 1; Increased time required;Verbal cues   Ambulation/Elevation   Gait pattern Improper Weight shift  (RLE NWB)   Gait Assistance 4  Minimal assist   Additional items Verbal cues   Assistive Device Rolling walker   Distance 10 feet x 2   Balance   Static Sitting Fair +   Dynamic Sitting Fair   Static Standing Fair   Dynamic Standing Poor +   Ambulatory Poor +   Endurance Deficit   Endurance Deficit Yes   Endurance Deficit Description Pain, LE instability   Activity Tolerance   Activity Tolerance Patient limited by fatigue;Patient limited by pain   Nurse Made Aware Appropriate to see per RN   Exercises   Knee AROM Long Arc Quad Sitting;15 reps;Right   Assessment   Prognosis Good   Problem List Decreased strength;Decreased endurance; Impaired balance;Decreased mobility;Orthopedic restrictions;Pain   Assessment Patient lying supine in bed, agreeable to participate in therapy  She was able to sit EOB with min A and LE management   She was able to ambulate (hop) with RW maintaing WB restrictions, however reports pain in LLE which limits her mobility at this time  Patient transferred to standard toilet with increase difficulty standing and min A  She was able to return to chair with RLE elevated due to increase pain  Discussed rehab as appropriate d/c plan at this time, patient with good understanding   recently underwent surgery and is unable to assist and also her home has ELSIE  She would continue to benefit from skilled PT to maximize functional independence  Barriers to Discharge Inaccessible home environment;Decreased caregiver support   Goals   Patient Goals To rest   STG Expiration Date 09/18/18   Treatment Day 1   Plan   Treatment/Interventions Functional transfer training;LE strengthening/ROM; Therapeutic exercise; Bed mobility;Gait training;Spoke to nursing;Spoke to case management   Progress Progressing toward goals   PT Frequency (3-6x a week)   Recommendation   Recommendation (rehab)   Equipment Recommended Dang Edgar; Wheelchair   PT - OK to Discharge Yes  (to rehab when medically stable)     Annabelle House, PTA

## 2018-09-09 NOTE — SOCIAL WORK
CM received a call from Pt's spouse Duane Mitchell, requesting SNF referrals be made to 1st choice [facility], 2nd choice [facility]  CM made the requested referral and will continue to follow

## 2018-09-09 NOTE — PROGRESS NOTES
Orthopedics   Leatha Zamora 68 y o  female MRN: [id number]  [facility]    Subjective:  68 y  o female post operative day 2 status post right Ankle ORIF  Pt doing well  Pain controlled      Labs:    0  Lab Value Date/Time   HCT 40 2 09/08/2018 0504   HCT 40 2 09/07/2018 0307   HGB 12 7 09/08/2018 0504   HGB 12 8 09/07/2018 0307   INR 0 98 09/07/2018 0514   WBC 10 94 (H) 09/08/2018 0504   WBC 15 66 (H) 09/07/2018 0307   ESR 38 (H) 09/07/2018 1440       Meds:    Current Facility-Administered Medications:     acetaminophen (TYLENOL) tablet 975 mg, 975 mg, Oral, Q8H [address], Leon Luis MD, 975 mg at 09/09/18 0543    calcium carbonate (TUMS) chewable tablet 500 mg, 500 mg, Oral, BID, Kristina Batres MD, 500 mg at 09/08/18 2011    cholecalciferol (VITAMIN D3) tablet 5,000 Units, 5,000 Units, Oral, BID, Kristina Batres MD, 5,000 Units at 09/08/18 2011    docusate sodium (COLACE) capsule 100 mg, 100 mg, Oral, BID, Leon Luis MD, 100 mg at 09/08/18 1806    enoxaparin (LOVENOX) subcutaneous injection 40 mg, 40 mg, Subcutaneous, Q24H [address], Joseph Nab, 40 mg at 09/08/18 0816    HYDROmorphone (DILAUDID) injection 0 5 mg, 0 5 mg, Intravenous, Q1H PRN, Leon Luis MD, 0 5 mg at 09/07/18 1625    ipratropium (ATROVENT HFA) inhaler 2 puff, 2 puff, Inhalation, Q6H, Leon Luis MD, 2 puff at 09/08/18 2013    levothyroxine tablet 50 mcg, 50 mcg, Oral, Early Morning, Leon Luis MD, 50 mcg at 09/09/18 0543    losartan (COZAAR) tablet 50 mg, 50 mg, Oral, Daily, Nolan Walton MD, 50 mg at 09/08/18 0817    ondansetron (ZOFRAN) injection 4 mg, 4 mg, Intravenous, Once, Leon Luis MD, Stopped at 09/07/18 0227    ondansetron (ZOFRAN) injection 4 mg, 4 mg, Intravenous, Q6H PRN, Leon Luis MD, 4 mg at 09/08/18 1538    oxyCODONE (ROXICODONE) immediate release tablet 10 mg, 10 mg, Oral, Q4H PRN, Leon Luis MD, 10 mg at 09/08/18 2012    oxyCODONE (ROXICODONE) IR tablet 5 mg, 5 mg, Oral, Q4H PRN, Megan Forman MD    pravastatin (PRAVACHOL) tablet 40 mg, 40 mg, Oral, Daily With Sandra Moss MD, 40 mg at 09/08/18 1806    senna (SENOKOT) tablet 8 6 mg, 1 tablet, Oral, Daily, Megan Forman MD, 8 6 mg at 09/08/18 0817    sodium chloride (OCEAN) 0 65 % nasal spray 1 spray, 1 spray, Each Nare, Q4H PRN, Cuate Morfin MD, 1 spray at 09/08/18 2012    Blood Culture:   No results found for: BLOODCX    Wound Culture:   No results found for: WOUNDCULT    Ins and Outs:  I/O last 24 hours: In: 8647 [P O :1460]  Out: 2400 [Urine:2300; Emesis/NG output:100]          Physical:  Vitals:    09/09/18 0000   BP: 129/61   Pulse: 78   Resp: 19   Temp: 98 6 °F (37 °C)   SpO2: 96%     right lower extremityORIF  · Dressings clean Dry Intact  · 4/5 strength to EHL/FHL  · Sensation intact L1-S1  · +2 DP Pulse    _*_*_*_*_*_*_*_*_*_*_*_*_*_*_*_*_*_*_*_*_*_*_*_*_*_*_*_*_*_*_*_*_*_*_*_*_*_*_*_*_*    Assessment:   Post operative day 2 status post right Ankle ORIF  Doing well      Plan:  · Nonweight bearing right lower extremity  · Up out of bed  · Ice, Elevation to affected extremity  · Analgesics  · DVT prophylaxis  · Dispo: Ortho will follow  ·  on board for d/c to [facility]    Rey Banegas PA-C

## 2018-09-09 NOTE — ASSESSMENT & PLAN NOTE
Increased vitamin-D replacement to 5000 units twice a day  Check SPEP and UPEP to rule out multiple myeloma  She has had a recent DEXA scan but we do not have access to the report  Start calcium 500 mg twice a day

## 2018-09-09 NOTE — PROGRESS NOTES
Progress Note Prasanth Soares 1945, 68 y o  female MRN: [id number]  Unit/Bed#: [facility] 924-01 Encounter: [id number]  Primary Care Provider: Day Nino MD   Date and time admitted to hospital: 9/6/2018  6:33 PM        * Closed fracture of right ankle   Assessment & Plan    POD #2:Status post right Ankle ORIF  Status post mechanical fall  As per primary team         COPD (chronic obstructive pulmonary disease) ([address] )   Assessment & Plan    History of COPD diagnosed 2 years ago not on home oxygen  She is only on Atrovent inhaler which will be continued  Not in acute exacerbation  Continue oxygen support as needed  Other hyperlipidemia   Assessment & Plan    Continue with Pravachol  Lipid panel reasonable  Other specified hypothyroidism   Assessment & Plan    TSH, free T4, reviewed  Patient is to continue with current dosage of levothyroxine  Leukocytosis   Assessment & Plan    Most likely stress related, WBC close to normalization  Vitamin D deficiency  Severe osteoporosis  Assessment & Plan  Increased vitamin-D replacement to 5000 units twice a day  Check SPEP and UPEP to rule out multiple myeloma  She has had a recent DEXA scan but we do not have access to the report  Start calcium 500 mg twice a day  SUBJECTIVE:   Patient seen and examined  She appears comfortable, pain better controlled  No acute events overnight      OBJECTIVE:   Vitals:    09/09/18 0724   BP: 122/61   Pulse: 81   Resp: 16   Temp: 98 °F (36 7 °C)   SpO2: 97%       Intake/Output Summary (Last 24 hours) at 09/09/18 0935  Last data filed at 09/09/18 0300   Gross per 24 hour   Intake              920 ml   Output             1450 ml   Net             -530 ml       Physical Exam:   GENERAL: AAO x 3  HEENT: atraumatic, normocephalic  Oral mucosa moist, no icterus, pallor  PERRLA +  Neck supple, no JVD, no lymphadenopathy, no thryomegaly  CHEST: B/L breath sounds heard, occasional wheezing    CVS: S1, S2   ABDOMEN: Soft/obese/NT/BS heard  NEUROLOGICAL: CN II -XI grossly intact  No focal motor or sensory deficits  No signs of meningeal irritation or cerebellar dysfunction  EXTREMITIES:Right lower extremity -- Dressing clean dry intact,4/5 strength to EHL/FHL   Sensation intact L1-S1  +2 DP Pulse     LABS:    9/8/2018 05:04   eGFR 89   Sodium 138   Potassium 3 7   Chloride 101   CO2 31   Anion Gap 6   BUN 8   Creatinine 0 64   Glucose 104   Calcium 8 0 (L)   Cholesterol 132   Triglycerides 80   HDL 64 (H)   LDL Calculated 52   WBC 10 94 (H)   RBC 4 00   Hemoglobin 12 7   HCT 40 2    (H)   MCH 31 8   MCHC 31 6   RDW 13 1   Platelets 403   MPV 9 7   nRBC 0   Neutrophils Relative 69   Immat GRANS % 0   Lymphocytes Relative 21   Monocytes Relative 10   Eosinophils Relative 0   Basophils Relative 0   Immature Grans Absolute 0 04   Neutrophils Absolute 7 52   Lymphocytes Absolute 2 28   Monocytes Absolute 1 06   Eosinophils Absolute 0 01   Basophils Absolute 0 03      Scheduled Meds:  Current Facility-Administered Medications:  acetaminophen 975 mg Oral [facility] Brad Ewing MD   calcium carbonate 500 mg Oral BID Devi Jane MD   cholecalciferol 5,000 Units Oral BID Devi Jane MD   docusate sodium 100 mg Oral BID Brad Ewing MD   enoxaparin 40 mg Subcutaneous Q24H [address] Guillermo Sybil   HYDROmorphone 0 5 mg Intravenous Q1H PRN Brad Ewing MD   ipratropium 2 puff Inhalation Q6H Brad Ewing MD   levothyroxine 50 mcg Oral Early Morning Brad Ewing MD   losartan 50 mg Oral Daily Shay Sexton MD   ondansetron 4 mg Intravenous Once Brad Ewing MD   ondansetron 4 mg Intravenous Q6H PRN Brad Ewing MD   oxyCODONE 10 mg Oral Q4H PRN Brad Ewing MD   oxyCODONE 5 mg Oral Q4H PRN Brad Ewing MD   pravastatin 40 mg Oral Daily With Paulino Jewell MD   senna 1 tablet Oral Daily Brad Ewing MD   sodium chloride 1 spray Each Nare Q4H PRN Shay Sexton MD     Continuous Infusions:   PRN Meds:  HYDROmorphone    ondansetron    oxyCODONE    oxyCODONE    sodium chloride     VTE prophylaxis: Enoxaparin     Education and Discussions with Family / Patient: As per primary team      Current Length of Stay: Day 2     Current Patient Status: As per primary team      Certification Statement: As per primary team      Discharge Plan / Estimated Discharge Date:  Plan for today discussed with case management and RN  As per primary team      Code Status: Level 1 - Full Code     Time Spent for Care: 20 minutes   More than 50% of total time spent on counseling and coordination of care as described above      [address], MD  HOSPITALIST SERVICES  9/9/2018     PLEASE NOTE:  This encounter was completed utilizing the [employer] Direct Speech Voice Recognition Software  Grammatical errors, random word insertions, pronoun errors and incomplete sentences are occasional consequences of the system due to software limitations, ambient noise and hardware issues  These may be missed by proof reading prior to affixing electronic signature  Any questions or concerns about the content, text or information contained within the body of this dictation should be directly addressed to the physician for clarification   Please do not hesitate to call me directly if you have any any questions or concerns

## 2018-09-09 NOTE — PLAN OF CARE
Problem: PHYSICAL THERAPY ADULT  Goal: Performs mobility at highest level of function for planned discharge setting  See evaluation for individualized goals  Treatment/Interventions: Functional transfer training, LE strengthening/ROM, Elevations, Therapeutic exercise, Endurance training, Cognitive reorientation, Patient/family training, Equipment eval/education, Bed mobility, Gait training, Compensatory technique education, Spoke to nursing, Spoke to case management  Equipment Recommended: Deja Rodrigues, Wheelchair       See flowsheet documentation for full assessment, interventions and recommendations  Outcome: Progressing  Prognosis: Good  Problem List: Decreased strength, Decreased endurance, Impaired balance, Decreased mobility, Orthopedic restrictions, Pain  Assessment: Patient lying supine in bed, agreeable to participate in therapy  She was able to sit EOB with min A and LE management  She was able to ambulate (hop) with RW maintaing WB restrictions, however reports pain in LLE which limits her mobility at this time  Patient transferred to standard toilet with increase difficulty standing and min A  She was able to return to chair with RLE elevated due to increase pain  Discussed rehab as appropriate d/c plan at this time, patient with good understanding   recently underwent surgery and is unable to assist and also her home has ELSIE  She would continue to benefit from skilled PT to maximize functional independence  Barriers to Discharge: Inaccessible home environment, Decreased caregiver support     Recommendation:  (rehab)     PT - OK to Discharge: Yes (to rehab when medically stable)    See flowsheet documentation for full assessment

## 2018-09-10 ENCOUNTER — APPOINTMENT (INPATIENT)
Dept: RADIOLOGY | Facility: HOSPITAL | Age: 73
DRG: 494 | End: 2018-09-10
Payer: MEDICARE

## 2018-09-10 VITALS
RESPIRATION RATE: 18 BRPM | HEART RATE: 83 BPM | BODY MASS INDEX: 28.87 KG/M2 | WEIGHT: 173.31 LBS | SYSTOLIC BLOOD PRESSURE: 121 MMHG | HEIGHT: 65 IN | TEMPERATURE: 97.6 F | OXYGEN SATURATION: 96 % | DIASTOLIC BLOOD PRESSURE: 59 MMHG

## 2018-09-10 LAB
ALBUMIN SERPL ELPH-MCNC: 3.94 G/DL (ref 3.5–5)
ALBUMIN SERPL ELPH-MCNC: 57.1 % (ref 52–65)
ALBUMIN UR ELPH-MCNC: 100 %
ALPHA1 GLOB MFR UR ELPH: 0 %
ALPHA1 GLOB SERPL ELPH-MCNC: 0.38 G/DL (ref 0.1–0.4)
ALPHA1 GLOB SERPL ELPH-MCNC: 5.5 % (ref 2.5–5)
ALPHA2 GLOB MFR UR ELPH: 0 %
ALPHA2 GLOB SERPL ELPH-MCNC: 0.88 G/DL (ref 0.4–1.2)
ALPHA2 GLOB SERPL ELPH-MCNC: 12.8 % (ref 7–13)
B-GLOBULIN MFR UR ELPH: 0 %
BETA GLOB ABNORMAL SERPL ELPH-MCNC: 0.43 G/DL (ref 0.4–0.8)
BETA1 GLOB SERPL ELPH-MCNC: 6.2 % (ref 5–13)
BETA2 GLOB SERPL ELPH-MCNC: 5.8 % (ref 2–8)
BETA2+GAMMA GLOB SERPL ELPH-MCNC: 0.4 G/DL (ref 0.2–0.5)
GAMMA GLOB ABNORMAL SERPL ELPH-MCNC: 0.87 G/DL (ref 0.5–1.6)
GAMMA GLOB MFR UR ELPH: 0 %
GAMMA GLOB SERPL ELPH-MCNC: 12.6 % (ref 12–22)
IGG/ALB SER: 1.33 {RATIO} (ref 1.1–1.8)
PROT PATTERN SERPL ELPH-IMP: ABNORMAL
PROT PATTERN UR ELPH-IMP: NORMAL
PROT SERPL-MCNC: 6.9 G/DL (ref 6.4–8.2)
PROT UR-MCNC: 9 MG/DL

## 2018-09-10 PROCEDURE — 73630 X-RAY EXAM OF FOOT: CPT

## 2018-09-10 RX ORDER — DOCUSATE SODIUM 100 MG/1
100 CAPSULE, LIQUID FILLED ORAL 2 TIMES DAILY
Qty: 10 CAPSULE | Refills: 0
Start: 2018-09-10 | End: 2021-08-03

## 2018-09-10 RX ORDER — SENNOSIDES 8.6 MG
1 TABLET ORAL DAILY
Qty: 120 EACH | Refills: 0
Start: 2018-09-11 | End: 2021-08-03

## 2018-09-10 RX ORDER — ACETAMINOPHEN 325 MG/1
TABLET ORAL
Qty: 30 TABLET | Refills: 0
Start: 2018-09-10

## 2018-09-10 RX ADMIN — LOSARTAN POTASSIUM 50 MG: 50 TABLET ORAL at 08:52

## 2018-09-10 RX ADMIN — ANTACID TABLETS 500 MG: 500 TABLET, CHEWABLE ORAL at 08:51

## 2018-09-10 RX ADMIN — DOCUSATE SODIUM 100 MG: 100 CAPSULE, LIQUID FILLED ORAL at 08:52

## 2018-09-10 RX ADMIN — ENOXAPARIN SODIUM 40 MG: 40 INJECTION SUBCUTANEOUS at 08:52

## 2018-09-10 RX ADMIN — ACETAMINOPHEN 975 MG: 325 TABLET, FILM COATED ORAL at 05:38

## 2018-09-10 RX ADMIN — IPRATROPIUM BROMIDE 2 PUFF: 17 AEROSOL, METERED RESPIRATORY (INHALATION) at 05:38

## 2018-09-10 RX ADMIN — VITAMIN D, TAB 1000IU (100/BT) 5000 UNITS: 25 TAB at 08:51

## 2018-09-10 RX ADMIN — SENNOSIDES 8.6 MG: 8.6 TABLET, FILM COATED ORAL at 08:52

## 2018-09-10 RX ADMIN — IPRATROPIUM BROMIDE 2 PUFF: 17 AEROSOL, METERED RESPIRATORY (INHALATION) at 11:19

## 2018-09-10 RX ADMIN — LEVOTHYROXINE SODIUM 50 MCG: 50 TABLET ORAL at 05:38

## 2018-09-10 NOTE — SOCIAL WORK
Cm met with patient and  to review discharge planning  Cm informed patient that [facility] does not have a bed and that CM continues to review  Patient and  inquiring if [facility] has a bed, and Cm informed them that [facility] concerned about her length of stay due to NWB status  Per conversation with patient and  they stated that they have a ranch style that is wheelchair accessible and that they do not wish for patient to remain in skilled facility for the duration of her NWB restrictions  Cm spoke with facility liaison who confirmed that facility will have a bed for patient today as long as financial application is completed  Cm uploaded application  Cm informed by facility liaison that [facility] will be able to transport at 2pm today  Cm informed patient and  about [facility], their ability to transport and confirmed that facility bed and transport is their choice  All parties aware and in agreement with discharge plan

## 2018-09-10 NOTE — ORTHOTIC NOTE
Orthotic Note            Date: 9/10/2018      Patient Name: Yakelin Gaitan        Time: 10:30am    Reason for Consult:  Patient Active Problem List   Diagnosis    Closed fracture of right ankle    COPD (chronic obstructive pulmonary disease) ([facility] [address] )    Other hyperlipidemia    Other specified hypothyroidism    Leukocytosis    Vitamin D deficiency    Age related osteoporosis   Bilateral Breg J-Walk Boots [id number]  Per Orthopedics  Shoe Size: 8     I measured, fit, and donned Medium Breg J-Walker boots to patient's BLEs while she was sitting up in bed  Patient tolerated well and instructions/adjustments reviewed during application of both boots  Air bladder intact for LLE to assist in giving more support to ankle/foot  My contact information and instructions at bedside  RN aware and I will continue daily follow up  Recommendations:  Please call Mobility Coordinator at ext  [phone] in regards to bracing instruction and/or adjustment  Emil Sin Mobility Coordinator LCFo, LCOF, ASOP R  O T, O B T

## 2018-09-10 NOTE — DISCHARGE SUMMARY
Discharge Summary - Orthopedics   Angie Owen 68 y o  female MRN: [id number]  Unit/Bed#: [facility] 924-01    Attending Physician: Marine Cooper    Admitting diagnosis: Closed trimalleolar fracture of right ankle, initial encounter [S82 851A]  Closed fracture of right ankle, initial encounter [S82 891A]  Closed fracture of right tibia and fibula, initial encounter [S82 201A, S82 401A]  Unspecified multiple injuries, initial encounter [T07  XXXA]    Discharge diagnosis: Closed trimalleolar fracture of right ankle, initial encounter [S82 851A]  Closed fracture of right ankle, initial encounter [S82 891A]  Closed fracture of right tibia and fibula, initial encounter [S82 201A, S82 401A]  Unspecified multiple injuries, initial encounter [T07  XXXA]    Date of admission: 9/6/2018    Date of discharge: 09/10/18    Procedure: ORIF R ankle     HPI & Hospital course:  68 y o  female who presented to the ED after a mechanical fall sustaining a right ankle fracture dislocation  Pt was admitted to the orthopaedic service and taken to the OR on 9/7/2018 for ORIF R ankle  Prior to surgery the risks and benefits of surgery were explained and informed consent was obtained  Surgery went without complications and pt was discharged to the PACU in a stable condition and was transferred to the floor  The patient was also found to have a fracture at the base of the left (contralateral) 5th metatarsal bone  On discharge date pt was cleared by PT and the medicine team and determined to be safe for discharge  Daily discussion was had with the patient, nursing staff, orthopaedic team, and family members if present  All questions were answered to the patients satisifaction  0  Lab Value Date/Time   HGB 12 7 09/08/2018 0504   HGB 12 8 09/07/2018 0307       Greater than 2 gram decrease in Hb qualifies for diagnosis of acute blood loss anemia  Vital signs remained stable and pt was resuscitated with IVF as needed       Discharge Instructions:   · NWB RLE in CAM boot, WBAT LLE in CAM boot  · Keep dressings clean and dry at all times   · Complete DVT prophylaxis as prescribed   · Physical therapy  · Follow-up as scheduled, otherwise call for appt  Discharge Medications: For the complete list of discharge medications, please refer to the patient's medication reconciliation

## 2018-09-10 NOTE — PHYSICIAN ADVISOR
Current patient class: Inpatient  The patient is currently on Hospital Day: 5 at [address] [place] [address]      This patient was originally admitted to the hospital under observation status  After admission the patient was reevaluated and determined to require further hospitalization  The patient is now documented to require at least a 2nd midnight in the hospital  As such the patient is now expected to satisfy the 2 midnight benchmark and is therefore eligible for inpatient admission  After review of the relevant documentation, labs, vital signs and test results, the patient is appropriate for INPATIENT ADMISSION  Admission to the hospital as an inpatient is a complex decision making process which requires the practitioner to consider the patients presenting complaint, history and physical examination and all relevant testing  With this in mind, in this case, the patient was deemed appropriate for INPATIENT ADMISSION  After review of the documentation and testing available at the time of the admission I concur with this clinical determination of medical necessity  Rationale is as follows:    68 y  o  female who presented to the ED after a mechanical fall sustaining a right ankle fracture dislocation   Pt was admitted to the orthopedic service and taken to the OR on 9/7/2018 for ORIF R ankle  Pt did well post operatively and was receiving physical therapy but was noting pain in left foot with PT  Xray of left foot was performed which showed nondisplaced fracture base of 1st metatarsal   Pt was fitted by PT with b/l LE CAM boots and is non weight bearing on RLE and weight bearing as tolerated on LLE  Pt was discharged today to [facility] for continued rehab and assistance   Given the documentation of medical necessity present in the chart for continued need for PT eval, the findings of additional fracture in other foot, the patients ambulatory safety concerns surrounding discharge, I feel this patient was appropriate for a greater than 2 midnight inpatient admission  The patients vitals on arrival were ED Triage Vitals [09/06/18 1834]   Temperature Pulse Respirations Blood Pressure SpO2   97 9 °F (36 6 °C) 84 18 136/64 94 %      Temp Source Heart Rate Source Patient Position - Orthostatic VS BP Location FiO2 (%)   Oral Monitor Lying Right arm --      Pain Score       9           Past Medical History:   Diagnosis Date    COPD (chronic obstructive pulmonary disease) (HCC)     Disease of thyroid gland     Hypertension      Past Surgical History:   Procedure Laterality Date    HYSTERECTOMY      ORIF TIBIA & FIBULA FRACTURES Right 9/7/2018    Procedure: OPEN REDUCTION W/ INTERNAL FIXATION (ORIF) ANKLE;  Surgeon: Abimael Cameron MD;  Location: [facility];  Service: Orthopedics       The patient was admitted to the hospital at [phone] on 9/7/18 for the following diagnosis:  Closed trimalleolar fracture of right ankle, initial encounter [S82 851A]  Closed fracture of right ankle, initial encounter [S82 891A]  Closed fracture of right tibia and fibula, initial encounter [S82 201A, S82 401A]  Unspecified multiple injuries, initial encounter [T07  XXXA]    Consults have been placed to:   IP CONSULT TO INTERNAL MEDICINE  IP CONSULT TO ENDOCRINOLOGY    Vitals:    09/09/18 0851 09/09/18 1500 09/09/18 2310 09/10/18 0700   BP:  114/57 120/71 121/59   BP Location:  Left arm Left arm Left arm   Pulse:  88 76 83   Resp:  16 16 18   Temp:  98 1 °F (36 7 °C) 98 °F (36 7 °C) 97 6 °F (36 4 °C)   TempSrc:  Oral Oral Oral   SpO2: 98% 100% 97% 96%   Weight:       Height:           Most recent labs:    Recent Labs      09/07/18   1440  09/08/18   0504   WBC   --   10 94*   HGB   --   12 7   HCT   --   40 2   PLT   --   283   K  3 9  3 7   NA  139  138   CALCIUM  8 6  8 0*   BUN  7  8   CREATININE  0 74  0 64   AST  18   --    ALT  25   --    ALKPHOS  44*   --        Scheduled Meds:  Current Facility-Administered Medications:  acetaminophen 975 mg Oral [facility] Moraima Leo MD   calcium carbonate 500 mg Oral BID Virginia Villegas MD   cholecalciferol 5,000 Units Oral BID Virginia Villegas MD   docusate sodium 100 mg Oral BID Moraima Leo MD   enoxaparin 40 mg Subcutaneous Q24H [facility] Guillermo Devine   HYDROmorphone 0 5 mg Intravenous Q1H PRN Moraima Leo MD   ipratropium 2 puff Inhalation Q6H Moraima Leo MD   levothyroxine 50 mcg Oral Early Morning Moraima Leo MD   losartan 50 mg Oral Daily Belen Toussaint MD   ondansetron 4 mg Intravenous Once Moraima Leo MD   ondansetron 4 mg Intravenous Q6H PRN Moraima Leo MD   oxyCODONE 10 mg Oral Q4H PRN Moraima Leo MD   oxyCODONE 5 mg Oral Q4H PRN Moraima Leo MD   pravastatin 40 mg Oral Daily With Radha White MD   senna 1 tablet Oral Daily Moraima Leo MD   sodium chloride 1 spray Each Nare Q4H PRN Belen Tosusaint MD     Continuous Infusions:   PRN Meds:  HYDROmorphone    ondansetron    oxyCODONE    oxyCODONE    sodium chloride    Surgical procedures (if appropriate):  Procedure(s):  OPEN REDUCTION W/ INTERNAL FIXATION (ORIF) ANKLE

## 2018-09-10 NOTE — PROGRESS NOTES
Rounded with Karlos Raya with Ortho  Plan is for patient to get xray of L foot for pain when bearing weight  Patient is awaiting placement

## 2018-09-10 NOTE — PROGRESS NOTES
Subjective: POD 3 s/p ORIF Right Ankle Fracture  Pain is controlled in RLE but started noting some pain in her left foot when she started putting weight on it with therapy  No shortness of breath currently but feels it is easier to breathe with oxygen  Objective:  Lab Results   Component Value Date/Time    WBC 10 94 (H) 09/08/2018 05:04 AM    HGB 12 7 09/08/2018 05:04 AM       Vitals:    09/09/18 2310   BP: 120/71   Pulse: 76   Resp: 16   Temp: 98 °F (36 7 °C)   SpO2: 97%     Right lower extremity  Dressing c/d/i  Moderate swelling  Motor & sensation grossly intact  2+ DP Pulse    Left lower extremity  No open wounds and no swelling  Minimal ecchymoses  TTP globally but mostly over dorsal midfoot  2+ DP Pulse    Assessment: POD 3 s/p ORIF R Ankle    Plan:  NWB RLE in AO Splint  Left foot x-ray ordered  FU  Pain control  DVT ppx-Lovenox  PT  Continue to try to wean off oxygen  SLIM following    Dispo

## 2018-09-17 NOTE — ED ATTENDING ATTESTATION
Hugo Reese MD, saw and evaluated the patient  I have discussed the patient with the resident/non-physician practitioner and agree with the resident's/non-physician practitioner's findings, Plan of Care, and MDM as documented in the resident's/non-physician practitioner's note, except where noted  All available labs and Radiology studies were reviewed  At this point I agree with the current assessment done in the Emergency Department  I have conducted an independent evaluation of this patient a history and physical is as follows:    Patient presents with right ankle injury after a fall down 1 step  Patient did strike head but did not lose consciousness  Currently  complaining only of right ankle pain  Exam: AAOx3, NAD,   No sign of head injury, RRR, CTA, S/NT/ND, no motor/sensory deficits,  right ankle deformity  A/P:  Right ankle fracture  Will check x-ray  Given the patient's struck head, will also CT head      Critical Care Time  CritCare Time    Procedures

## 2018-09-21 ENCOUNTER — TELEPHONE (OUTPATIENT)
Dept: OBGYN CLINIC | Facility: CLINIC | Age: 73
End: 2018-09-21

## 2018-09-21 NOTE — TELEPHONE ENCOUNTER
Dr Abhijit Smith is coming in on Monday for a p/o orif r ankle fx fibular plate sx 9/7  Her  Liat Hamilton cannot attend the appointment due to shingles and wants to know if you would be able to "facetime" during her appt so he knows what's going on  Their daughter is bringing her but does not do well going into appointments  Please call him @ [phone]    Thank you

## 2018-09-24 ENCOUNTER — HOSPITAL ENCOUNTER (OUTPATIENT)
Dept: RADIOLOGY | Facility: HOSPITAL | Age: 73
Discharge: HOME | End: 2018-09-24
Attending: ORTHOPAEDIC SURGERY
Payer: MEDICARE

## 2018-09-24 ENCOUNTER — OFFICE VISIT (OUTPATIENT)
Dept: OBGYN CLINIC | Facility: HOSPITAL | Age: 73
End: 2018-09-24

## 2018-09-24 VITALS
WEIGHT: 165 LBS | SYSTOLIC BLOOD PRESSURE: 129 MMHG | HEIGHT: 65 IN | BODY MASS INDEX: 27.49 KG/M2 | HEART RATE: 93 BPM | DIASTOLIC BLOOD PRESSURE: 77 MMHG

## 2018-09-24 DIAGNOSIS — Z98.890 S/P ORIF (OPEN REDUCTION INTERNAL FIXATION) FRACTURE: ICD-10-CM

## 2018-09-24 DIAGNOSIS — Z87.81 S/P ORIF (OPEN REDUCTION INTERNAL FIXATION) FRACTURE: ICD-10-CM

## 2018-09-24 DIAGNOSIS — M25.572 PAIN, JOINT, ANKLE AND FOOT, LEFT: ICD-10-CM

## 2018-09-24 DIAGNOSIS — S82.891A CLOSED FRACTURE OF RIGHT ANKLE, INITIAL ENCOUNTER: ICD-10-CM

## 2018-09-24 DIAGNOSIS — S82.891A CLOSED FRACTURE OF RIGHT ANKLE, INITIAL ENCOUNTER: Primary | ICD-10-CM

## 2018-09-24 PROCEDURE — 73630 X-RAY EXAM OF FOOT: CPT

## 2018-09-24 PROCEDURE — 73610 X-RAY EXAM OF ANKLE: CPT

## 2018-09-24 PROCEDURE — 99024 POSTOP FOLLOW-UP VISIT: CPT | Performed by: ORTHOPAEDIC SURGERY

## 2018-09-24 NOTE — PROGRESS NOTES
Assessment/Plan:      Patient seen and examined  She is roughly 2 weeks s/p right ankle ORIF (9/7/2018)  Overall she is doing well with minimal pain in the right lower extremity  X-rays in the office today demonstrate intact hardware  Incisions are clean dry and intact without evidence of infection or dehiscence  Sutures removed and Steri-Strips applied  She is NWB RLE in CAM boot  Continue SQ Lovenox to completion  Follow-up in 4 weeks for re-evaluation and new x-rays (right ankle and left foot)  She also has a left 5th metatarsal fracture treated with CAM boot  She can be WBAT LLE in CAM boot  Problem List Items Addressed This Visit     Closed fracture of right ankle - Primary    S/P ORIF (open reduction internal fixation) fracture            Subjective:      Patient ID: Steve Jalloh is a 68 y o  female  HPI    Patient is a 41-year-old female presents for postop visit  She is roughly two weeks status post right ankle ORIF (9/7/2018)  Today the patient states overall she is doing well with minimal pain in the right and left lower extremities  She is taking Tylenol for pain control which works well  She is now transition to home from rehab  She denies any fever chills or issues with her incisions  She denies any numbness or tingling of the lower extremities  She has been taking prescribed Lovenox for DVT prophylaxis  Of note she also sustained a left metatarsal fracture treated with Cam boot  The following portions of the patient's history were reviewed and updated as appropriate: allergies, current medications, past family history, past medical history, past social history, past surgical history and problem list     Review of Systems   Constitutional: Negative for chills, diaphoresis, fatigue and fever  Respiratory: Negative for shortness of breath and wheezing  Cardiovascular: Negative for chest pain, palpitations and leg swelling     Musculoskeletal: Positive for joint swelling  Neurological: Negative for numbness  Objective: There were no vitals taken for this visit  Physical Exam   Constitutional: She is oriented to person, place, and time  She appears well-developed and well-nourished  No distress  HENT:   Head: Normocephalic and atraumatic  Pulmonary/Chest: Effort normal    Abdominal: Soft  Musculoskeletal: She exhibits edema and tenderness  Neurological: She is alert and oriented to person, place, and time  Skin: Skin is warm and dry  She is not diaphoretic  Psychiatric: She has a normal mood and affect  Nursing note and vitals reviewed  NAD  Patient seated comfortably in wheelchair  RLE:  Medial and lateral incisions are c/d/i, no erythema, drainage, or wound dehiscence  There is expected post-op swelling and ecchymoses  She is able to wiggle toes  Toes are warm and mobile    Sensation intact   LLE in CAM boot

## 2018-10-25 ENCOUNTER — OFFICE VISIT (OUTPATIENT)
Dept: OBGYN CLINIC | Facility: HOSPITAL | Age: 73
End: 2018-10-25

## 2018-10-25 ENCOUNTER — HOSPITAL ENCOUNTER (OUTPATIENT)
Dept: RADIOLOGY | Facility: HOSPITAL | Age: 73
Discharge: HOME | End: 2018-10-25
Attending: ORTHOPAEDIC SURGERY

## 2018-10-25 ENCOUNTER — HOSPITAL ENCOUNTER (OUTPATIENT)
Dept: RADIOLOGY | Facility: HOSPITAL | Age: 73
Discharge: HOME | End: 2018-10-25
Attending: ORTHOPAEDIC SURGERY
Payer: MEDICARE

## 2018-10-25 VITALS
HEART RATE: 94 BPM | DIASTOLIC BLOOD PRESSURE: 73 MMHG | BODY MASS INDEX: 27.49 KG/M2 | HEIGHT: 65 IN | SYSTOLIC BLOOD PRESSURE: 117 MMHG | WEIGHT: 165 LBS

## 2018-10-25 DIAGNOSIS — M25.571 PAIN, JOINT, ANKLE AND FOOT, RIGHT: ICD-10-CM

## 2018-10-25 DIAGNOSIS — M25.571 PAIN, JOINT, ANKLE AND FOOT, RIGHT: Primary | ICD-10-CM

## 2018-10-25 DIAGNOSIS — Z87.81 S/P ORIF (OPEN REDUCTION INTERNAL FIXATION) FRACTURE: ICD-10-CM

## 2018-10-25 DIAGNOSIS — S82.891A CLOSED FRACTURE OF RIGHT ANKLE, INITIAL ENCOUNTER: ICD-10-CM

## 2018-10-25 DIAGNOSIS — Z98.890 S/P ORIF (OPEN REDUCTION INTERNAL FIXATION) FRACTURE: ICD-10-CM

## 2018-10-25 DIAGNOSIS — M79.672 PAIN IN LEFT FOOT: ICD-10-CM

## 2018-10-25 DIAGNOSIS — S92.355D CLOSED NONDISPLACED FRACTURE OF FIFTH METATARSAL BONE OF LEFT FOOT WITH ROUTINE HEALING, SUBSEQUENT ENCOUNTER: ICD-10-CM

## 2018-10-25 PROCEDURE — 73630 X-RAY EXAM OF FOOT: CPT

## 2018-10-25 PROCEDURE — 73610 X-RAY EXAM OF ANKLE: CPT

## 2018-10-25 PROCEDURE — 99024 POSTOP FOLLOW-UP VISIT: CPT | Performed by: ORTHOPAEDIC SURGERY

## 2018-10-25 NOTE — PROGRESS NOTES
Assessment/Plan:    Patient seen and examined today  She is now about 7 weeks S/P RIGHT ankle ORIF performed on 9/7/2018  Overall, the patient continues to do well post operatively  X Rays of the right ankle today reveal intact hardware with appropriate healing at this time  Steri-Strips removed today  Bandage applied to friction area on lateral aspect of RIGHT ankle  She is to remain NWB on the right lower extremity  She may discontinue use of Cam boot for 2 weeks to allow lateral friction wound to heal (Bandage applied today)  She is to begin use of CAM boot 2 weeks from now and remain NWB on the R LE  Patient will begin outpatient physical therapy for passive range of motion exercises of her right ankle  She will follow up in 4 weeks for re evaluation  As for her LEFT foot 5th metatarsal fracture  She may discontinue use of CAM boot at this time and transition into sneakers  She may be WBAT on the L LE   X-rays today left foot reveal well-healed 5th metatarsal fracture  No further x rays needed         Problem List Items Addressed This Visit     Closed fracture of right ankle    Relevant Orders    Ambulatory referral to Physical Therapy    S/P ORIF (open reduction internal fixation) fracture    Relevant Orders    Ambulatory referral to Physical Therapy      Other Visit Diagnoses     Pain, joint, ankle and foot, right    -  Primary    Relevant Orders    XR ankle 3+ vw right    Pain in left foot        Relevant Orders    XR foot 3+ vw left    Closed nondisplaced fracture of fifth metatarsal bone of left foot with routine healing, subsequent encounter                Subjective:      Patient ID: Beth Castellano is a 68 y o  female  HPI   Patient is a 67 y/o female presents today for a post operative visit  She is now about 7 weeks S/P RIGHT ankle ORIF performed on 9/7/2018  She has also been treating for a 5th metatarsal fracture of the LEFT foot       Today the patient states that overall she is doing well with minimal to no pain in the right ankle and left lower extremities on a daily basis  She has been compliant with the use of a Cam walker boot on both right and left lower extremities  She continues to only take Tylenol for pain control  Denies numbness and tingling  The following portions of the patient's history were reviewed and updated as appropriate: allergies, current medications, past family history, past medical history, past social history, past surgical history and problem list     Review of Systems   Constitutional: Negative for chills, fever and unexpected weight change  HENT: Negative for hearing loss, nosebleeds and sore throat  Eyes: Negative for pain, redness and visual disturbance  Respiratory: Negative for cough, shortness of breath and wheezing  Cardiovascular: Negative for chest pain, palpitations and leg swelling  Gastrointestinal: Negative for abdominal distention, nausea and vomiting  Endocrine: Negative for polydipsia and polyuria  Genitourinary: Negative for dysuria and hematuria  Skin: Negative for rash and wound  Neurological: Negative for dizziness, numbness and headaches  Psychiatric/Behavioral: Negative for decreased concentration and suicidal ideas  Objective:      /73   Pulse 94   Ht 5' 5" (1 651 m)   Wt 74 8 kg (165 lb)   BMI 27 46 kg/m²          Physical Exam   Constitutional: She is oriented to person, place, and time  She appears well-developed and well-nourished  Eyes: Pupils are equal, round, and reactive to light  Pulmonary/Chest: Effort normal and breath sounds normal    Neurological: She is alert and oriented to person, place, and time  Skin: Skin is warm and dry  Psychiatric: She has a normal mood and affect   Her behavior is normal  Judgment and thought content normal      NAD: Patient is seated comfortably in a wheelchair    RLE:  Medial and lateral ankle incision remain C/D/I, no signs of infection, no erythema, drainage, or wound dehiscence  She has full range of motion of toes  Toes are warm sensate and mobile  Patient is neurovascular intact distally  LLE:  Patient remains in CAM walker boot today    She demonstrates no tenderness over 5th metatarsal

## 2018-10-26 ENCOUNTER — TELEPHONE (OUTPATIENT)
Dept: OBGYN CLINIC | Facility: HOSPITAL | Age: 73
End: 2018-10-26

## 2018-10-26 DIAGNOSIS — Z87.81 S/P ORIF (OPEN REDUCTION INTERNAL FIXATION) FRACTURE: Primary | ICD-10-CM

## 2018-10-26 DIAGNOSIS — Z98.890 S/P ORIF (OPEN REDUCTION INTERNAL FIXATION) FRACTURE: Primary | ICD-10-CM

## 2018-10-26 NOTE — TELEPHONE ENCOUNTER
Caller: [facility]  #:[phone]  Dr Evans Mulliganer called in requesting a call back  Vadim from [address] [place] [address] called in wanting to know if you like patient to continue home health until she starts walking or would you like for patient to get discharged and start outpatient? And Harmony Carpenter will also like to know if you would like for home health to treat wound care or would you like for the patient to do it on her own? Please advise, thank you

## 2018-10-26 NOTE — TELEPHONE ENCOUNTER
New order placed for home health PT  Continue to monitor incision for signs/symptoms of infection  Dressing/band-aid changes daily and PRN saturation  Keep dry as possible  OK to let water run over the pat dry    Thanks

## 2018-10-26 NOTE — TELEPHONE ENCOUNTER
Dr Philly Tnog patient -  R Ankle ORIF 9/7 and L 5th Metatarsal Fx    I spoke with Hassell Leyden and PT today and Hassell Leyden [facility] RN states that patient has some clear drainage come from ankle wound asking for instruction  I advise to have patient clean with soap and water, pat dry and apply bandaid to decrease friction  PT states they would like an order for patient to have another 3-4 weeks in home PT due to NWB status and her ROM  I advised them that I am sure Dr Philly Tong will be in agreement with PT recommendations   Please advise

## 2018-10-29 ENCOUNTER — TELEPHONE (OUTPATIENT)
Dept: OBGYN CLINIC | Facility: HOSPITAL | Age: 73
End: 2018-10-29

## 2018-10-29 NOTE — TELEPHONE ENCOUNTER
Called [facility]a and left voicemail giving instructions    Thanks
Patient sees Dr Baylee Chambers, physical therapist working with the patient   [phone]    Wilbert Cast is calling because they were ordered in for her right ankle  Wilbert Cast would like to know if you could also order for her left ankle  Wilbert Cast is stating that she was told that the patient broke both of them  Wilbert Cast can take a verbal order  Please advise 
100.5

## 2018-11-09 ENCOUNTER — TELEPHONE (OUTPATIENT)
Dept: OBGYN CLINIC | Facility: HOSPITAL | Age: 73
End: 2018-11-09

## 2018-11-09 NOTE — TELEPHONE ENCOUNTER
Patient sees Dr Jennifer Olvera, patient's  is calling to find out the protocol for the patient wearing the boot  Brenda Haque is stating that the wound is healed  Please advise

## 2018-11-26 ENCOUNTER — OFFICE VISIT (OUTPATIENT)
Dept: OBGYN CLINIC | Facility: HOSPITAL | Age: 73
End: 2018-11-26

## 2018-11-26 ENCOUNTER — HOSPITAL ENCOUNTER (OUTPATIENT)
Dept: RADIOLOGY | Facility: HOSPITAL | Age: 73
Discharge: HOME | End: 2018-11-26
Attending: ORTHOPAEDIC SURGERY
Payer: MEDICARE

## 2018-11-26 VITALS
BODY MASS INDEX: 27.49 KG/M2 | HEIGHT: 65 IN | WEIGHT: 165 LBS | DIASTOLIC BLOOD PRESSURE: 79 MMHG | SYSTOLIC BLOOD PRESSURE: 127 MMHG | HEART RATE: 94 BPM

## 2018-11-26 DIAGNOSIS — T14.8XXA FRACTURE: ICD-10-CM

## 2018-11-26 DIAGNOSIS — Z98.890 S/P ORIF (OPEN REDUCTION INTERNAL FIXATION) FRACTURE: Primary | ICD-10-CM

## 2018-11-26 DIAGNOSIS — Z87.81 S/P ORIF (OPEN REDUCTION INTERNAL FIXATION) FRACTURE: Primary | ICD-10-CM

## 2018-11-26 PROCEDURE — 73610 X-RAY EXAM OF ANKLE: CPT

## 2018-11-26 PROCEDURE — 99024 POSTOP FOLLOW-UP VISIT: CPT | Performed by: ORTHOPAEDIC SURGERY

## 2018-11-26 NOTE — ASSESSMENT & PLAN NOTE
Patient is roughly 2 months status post ORIF right ankle  Left 5th metatarsal base fracture was treated conservatively  On today's visit she reports minimal discomfort to the medial aspect of the right ankle  Overall she is happy with the progress thus far  On physical exam she demonstrates no gross deformity of bilateral lower extremities  Good range of motion about the right ankle  Incisions are well healed  Minimal tenderness over the right medial malleolus  X-ray today demonstrates stable symmetric mortise and stable fixation  There is evidence of callus formation  Assessment and plan  Successful treatment of left 5th metatarsal base fracture conservatively  Doing well status post ORIF right ankle  Partial weight-bearing 50%  Start therapy  Follow-up in 4 weeks for re-evaluation and new x-rays

## 2018-11-26 NOTE — PROGRESS NOTES
Assessment/Plan:    3 months status post ORIF right ankle on 09/07/2018  · Start physical therapy with 50% weightbearing  · Patient was encouraged to get a cane  · Follow up in 1 month       Problem List Items Addressed This Visit     S/P ORIF (open reduction internal fixation) fracture - Primary     Patient is roughly 2 months status post ORIF right ankle  Left 5th metatarsal base fracture was treated conservatively  On today's visit she reports minimal discomfort to the medial aspect of the right ankle  Overall she is happy with the progress thus far  On physical exam she demonstrates no gross deformity of bilateral lower extremities  Good range of motion about the right ankle  Incisions are well healed  Minimal tenderness over the right medial malleolus  X-ray today demonstrates stable symmetric mortise and stable fixation  There is evidence of callus formation  Assessment and plan  Successful treatment of left 5th metatarsal base fracture conservatively  Doing well status post ORIF right ankle  Partial weight-bearing 50%  Start therapy  Follow-up in 4 weeks for re-evaluation and new x-rays  Relevant Orders    Ambulatory referral to Physical Therapy      Other Visit Diagnoses     Fracture        Relevant Orders    XR ankle 3+ vw right    Ambulatory referral to Physical Therapy            Subjective:      Patient ID: Gilda Mills is a 68 y o  female  HPI  Patient presents to the office roughly 3 months status post ORIF right ankle on 09/07/2018  She has been compliant with her restrictions  She reports doing well overall  She is accompanied by her  today in the office  They wish to travel to [place] in January 2019      The following portions of the patient's history were reviewed and updated as appropriate: current medications, past family history, past medical history, past social history, past surgical history and problem list     Review of Systems   Constitutional: Negative for chills and fever  HENT: Negative for drooling and sneezing  Eyes: Negative for redness  Respiratory: Negative for cough and wheezing  Gastrointestinal: Negative for nausea and vomiting  Psychiatric/Behavioral: Negative for behavioral problems  The patient is not nervous/anxious  Objective:      /79   Pulse 94   Ht 5' 5" (1 651 m)   Wt 74 8 kg (165 lb)   BMI 27 46 kg/m²          Physical Exam   Constitutional: She is oriented to person, place, and time  She appears well-developed and well-nourished  HENT:   Head: Normocephalic and atraumatic  Eyes: Pupils are equal, round, and reactive to light  Neck: Neck supple  Cardiovascular: Intact distal pulses  Pulmonary/Chest: Effort normal and breath sounds normal    Neurological: She is alert and oriented to person, place, and time  Skin: Skin is warm and dry  Psychiatric: She has a normal mood and affect   Her behavior is normal        Patient presents to the office in a wheelchair  Full ROM  Incisions are clean, dry and intact    Imaging:  X-rays of right ankle were reviewed and shows hardware in good alignment

## 2018-11-29 ENCOUNTER — EVALUATION (OUTPATIENT)
Dept: PHYSICAL THERAPY | Age: 73
End: 2018-11-29
Payer: MEDICARE

## 2018-11-29 DIAGNOSIS — Z98.890 S/P ORIF (OPEN REDUCTION INTERNAL FIXATION) FRACTURE: Primary | ICD-10-CM

## 2018-11-29 DIAGNOSIS — Z87.81 S/P ORIF (OPEN REDUCTION INTERNAL FIXATION) FRACTURE: Primary | ICD-10-CM

## 2018-11-29 DIAGNOSIS — T14.8XXA FRACTURE: ICD-10-CM

## 2018-11-29 PROCEDURE — G8979 MOBILITY GOAL STATUS: HCPCS | Performed by: PHYSICAL THERAPIST

## 2018-11-29 PROCEDURE — G8978 MOBILITY CURRENT STATUS: HCPCS | Performed by: PHYSICAL THERAPIST

## 2018-11-29 PROCEDURE — 97161 PT EVAL LOW COMPLEX 20 MIN: CPT | Performed by: PHYSICAL THERAPIST

## 2018-11-29 NOTE — PROGRESS NOTES
PT Evaluation     Today's date: 2018  Patient name: Ignacio Ma  : 1945  MRN: [id number]  Referring provider: Ruby Orr MD  Dx:   Encounter Diagnosis     ICD-10-CM    1  Fracture T14  8XXA Ambulatory referral to Physical Therapy   2  S/P ORIF (open reduction internal fixation) fracture Z96 7 Ambulatory referral to Physical Therapy    Z87 81                   Assessment  Assessment details: Patient comes to therapy 3 months s/p R ankle ORIF with referral by orthopedic doctor  Patient has PWB status on right ankle and currently uses w/c  Examination revealed significant ROM and strength deficits of the right ankle as compared to left; especially ankle DF ROM  Patient presents with increased swelling on the right ankle and no pain with any active movements  Patient would benefit from PT guided flexibility and strength exercise program to normalize mobility and ADL's as FWB status approaches  Once FWB status is permitted patient would benefit from progressed strength and ROM program with added balance and proprioception exercises to target balance deficit  Patient was provided with HEP which will be progressed per tolerance  Patient was also educated on WB status and STM for swelling management and their importance in healing process  Impairments: abnormal gait, abnormal or restricted ROM, impaired balance, impaired physical strength, lacks appropriate home exercise program and weight-bearing intolerance  Functional limitations: Ambulation restricted due to [facility] precaution  Symptom irritability: lowUnderstanding of Dx/Px/POC: good  Goals  Impairment Goals to be met within 4 weeks  -Improve ankle DF ROM to 10 degrees   -Improve ankle Eversion ROM to 18 degrees  -Increase ankle strength to 5/5 throughout  - Increase B LE to 5/5 throughout      Functional Goals to be met within 4 weeks    -Return to Prior Level of Function  -Increase Functional Status Measure to: Above initial  -Patient will be independent with HEP  -Wean from assisted device when applicable  - Gait training with CAM boot with WB precautions  Plan  Patient would benefit from: skilled physical therapy  Planned modality interventions: thermotherapy: hydrocollator packs and cryotherapy  Planned therapy interventions: balance, balance/weight bearing training, coordination, flexibility, functional ROM exercises, gait training, home exercise program, graded exercise, therapeutic exercise, therapeutic activities, stretching, strengthening, postural training, patient education, neuromuscular re-education, abdominal trunk stabilization and manual therapy  Frequency: 2x week  Duration in weeks: 4  Treatment plan discussed with: patient and family        Subjective Evaluation    History of Present Illness  Date of surgery: 2018  Mechanism of injury: dislocation and trauma  Mechanism of injury: Patient referred by orthopedic doctor after ORIF performed about 3 months ago  Initial injury sustained when patient fell from single step leaving the home  Patient was walking out her front door, fell and dislocated her ankle; unsure of how/why she fell  she remained on the ground until the ambulance was able to transport her to the ER  X-rays revealed fractures to the distal tibia and fibula of the right LE which was treated with ORIF  Patient also noted fracture of the base of the 5th metatarsal on left LE that was treated conservatively with CAM boot x 2 months     Not a recurrent problem   Quality of life: fair    Pain  Current pain ratin  At best pain ratin  At worst pain ratin  Location: R ankle  Quality: dull ache  Aggravating factors: standing and walking  Progression: no change    Social Support  Steps to enter house: yes  Stairs in house: no   Lives in: [facility]  Lives with: spouse    Employment status: not working    Diagnostic Tests  X-ray: abnormal  Treatments  Previous treatment: immobilization  Patient Goals  Patient goals for therapy: decreased edema, decreased pain, improved balance, increased motion, increased strength and independence with ADLs/IADLs          Objective     Static Posture     Ankle/Foot   Ankle/Foot (Right): Hallux valgus  Observations     Right Ankle/Foot   Positive for effusion and incision       Additional Observation Details  Right foot with red coloration that reduces with soft tissue mobilization    Active Range of Motion   Left Ankle/Foot   Dorsiflexion (ke): 5 degrees   Plantar flexion: 65 degrees   Inversion: 30 degrees   Eversion: 18 degrees     Right Ankle/Foot   Dorsiflexion (ke): -8 degrees   Plantar flexion: 52 degrees   Inversion: 13 degrees   Eversion: 7 degrees     Strength/Myotome Testing     Left Hip   Planes of Motion   Flexion: 4-  Abduction: 4-    Right Hip   Planes of Motion   Flexion: 4-  Abduction: 4-    Left Knee   Flexion: 4-  Extension: 4-    Right Knee   Flexion: 4-  Extension: 4-    Left Ankle/Foot   Dorsiflexion: 4-  Plantar flexion: 4  Inversion: 3+  Eversion: 4    Right Ankle/Foot   Dorsiflexion: 4-  Plantar flexion: 3+  Inversion: 3+  Eversion: 3+    Swelling   Left Ankle/Foot   Figure 8: 48 cm    Right Ankle/Foot   Figure 8: 54 cm    Ambulation   Weight-Bearing Status   Weight-Bearing Status (Right): partial weight-bearing    Assistive device used: wheelchair and walker      Flowsheet Rows      Most Recent Value   PT/OT G-Codes   Current Score  32   Projected Score  54          Precautions 50% weightbearing R LE    Specialty Daily Treatment Diary       Manual 11/29       Scar massage Taught for HEP       PROM                 Exercise Diary                 Seated gastroc stretch 3x:10       Ankle AROM 10x               Ankle circles        Ankle TB all directions  red      Seated HR/TR                SLR flexion        S/l hip abd        Bridges with CAM boot                Gait training with 50% WB, RW Modalities        CP PRN

## 2018-12-04 ENCOUNTER — PATIENT OUTREACH (OUTPATIENT)
Dept: OTHER | Facility: HOSPITAL | Age: 73
End: 2018-12-04

## 2018-12-04 ENCOUNTER — OFFICE VISIT (OUTPATIENT)
Dept: PHYSICAL THERAPY | Age: 73
End: 2018-12-04
Payer: MEDICARE

## 2018-12-04 DIAGNOSIS — Z98.890 S/P ORIF (OPEN REDUCTION INTERNAL FIXATION) FRACTURE: ICD-10-CM

## 2018-12-04 DIAGNOSIS — Z87.81 S/P ORIF (OPEN REDUCTION INTERNAL FIXATION) FRACTURE: ICD-10-CM

## 2018-12-04 DIAGNOSIS — T14.8XXA FRACTURE: Primary | ICD-10-CM

## 2018-12-04 PROCEDURE — 97110 THERAPEUTIC EXERCISES: CPT | Performed by: SPECIALIST/TECHNOLOGIST

## 2018-12-04 NOTE — PROGRESS NOTES
Daily Note     Today's date: 2018  Patient name: Vernon Santacruz  : 1945  MRN: [id number]  Referring provider: Penny Jefferson MD  Dx:   Encounter Diagnosis     ICD-10-CM    1  Fracture T14  8XXA    2  S/P ORIF (open reduction internal fixation) fracture Z96 7     Z87 81                   Subjective: Pt reports active performance of HEP since initial evaluation  Objective: See treatment diary below  Precautions 50% weightbearing R LE    Specialty Daily Treatment Diary       Manual       Scar massage Taught for HEP       PROM   15'              Exercise Diary                 Seated gastroc stretch 3x:10 3x :10      Ankle AROM 10x 20x each              Ankle circles  20x      Ankle TB all directions  20x red  No TB with EV      Seated HR/TR  20x              SLR flexion  20x      S/l hip abd  Standing- 20x      Bridges with CAM boot  20x              Gait training with 50% WB, RW  10'                                                               Modalities        CP PRN                          Assessment: Treatment focus on AROM, light strengthening, and weight shifting  Pt unable to perform theraband eversion due to weakness/pain and side lying hip abduction due to weakness- modified to standing  Pt hesitant to perform weight shifts, but became increasingly comfortable  Pt able to perform step to gait pattern with 50% WB in // bars  Tolerated treatment fair  Patient demonstrated fatigue post treatment, exhibited good technique with therapeutic exercises and would benefit from continued PT to improve function of RLE  Plan: Continue per plan of care  Progress treatment as tolerated

## 2018-12-06 ENCOUNTER — OFFICE VISIT (OUTPATIENT)
Dept: PHYSICAL THERAPY | Age: 73
End: 2018-12-06
Payer: MEDICARE

## 2018-12-06 DIAGNOSIS — T14.8XXA FRACTURE: Primary | ICD-10-CM

## 2018-12-06 DIAGNOSIS — Z98.890 S/P ORIF (OPEN REDUCTION INTERNAL FIXATION) FRACTURE: ICD-10-CM

## 2018-12-06 DIAGNOSIS — Z87.81 S/P ORIF (OPEN REDUCTION INTERNAL FIXATION) FRACTURE: ICD-10-CM

## 2018-12-06 PROCEDURE — 97116 GAIT TRAINING THERAPY: CPT

## 2018-12-06 PROCEDURE — 97110 THERAPEUTIC EXERCISES: CPT

## 2018-12-06 NOTE — PROGRESS NOTES
Daily Note     Today's date: 2018  Patient name: Elis Dawson  : 1945  MRN: [id number]  Referring provider: Marco A Gil MD  Dx:   Encounter Diagnosis     ICD-10-CM    1  Fracture T14  8XXA    2  S/P ORIF (open reduction internal fixation) fracture Z96 7     Z87 81                   Subjective: Pt reported after last session some pain on lateral ankle that went away  No current pain  Pt and patient  anxious to know what happens after 2 weeks, 50 % WB with the RW  Objective: See treatment diary below  Precautions 50% weightbearing R LE    Specialty Daily Treatment Diary       Manual      Scar massage Taught for HEP       PROM   15' 15'             Exercise Diary                 Seated gastroc stretch 3x:10 3x :10 nt     Ankle AROM 10x 20x each 20x ea  Ankle circles  20x 20x     Ankle TB all directions  20x red  No TB with EV 20x red  No TB with EV     Seated HR/TR  20x nt             SLR flexion  20x 20x     S/l hip abd  Standing- 20x Standing 20x     Bridges with CAM boot  20x 20x             Gait training with 50% WB, RW  10'  15'                                                             Modalities        CP PRN                          Assessment: Treatment focused on ambulation with 50% WB with a RW and verbal cues  Pt performed properly throughout the treatment session  Pt educated she can use the RW, following precautions walking at home for short distances  Evaluating therapist contacted the MD about the families questions and concerns  Tolerated treatment fair  Patient demonstrated fatigue post treatment, exhibited good technique with therapeutic exercises and would benefit from continued PT to improve function of RLE  Plan: Continue per plan of care  Progress treatment as tolerated

## 2018-12-10 ENCOUNTER — PATIENT OUTREACH (OUTPATIENT)
Dept: OTHER | Facility: HOSPITAL | Age: 73
End: 2018-12-10

## 2018-12-11 ENCOUNTER — OFFICE VISIT (OUTPATIENT)
Dept: PHYSICAL THERAPY | Age: 73
End: 2018-12-11
Payer: MEDICARE

## 2018-12-11 ENCOUNTER — PATIENT OUTREACH (OUTPATIENT)
Dept: OTHER | Facility: HOSPITAL | Age: 73
End: 2018-12-11

## 2018-12-11 DIAGNOSIS — Z98.890 S/P ORIF (OPEN REDUCTION INTERNAL FIXATION) FRACTURE: ICD-10-CM

## 2018-12-11 DIAGNOSIS — T14.8XXA FRACTURE: Primary | ICD-10-CM

## 2018-12-11 DIAGNOSIS — Z87.81 S/P ORIF (OPEN REDUCTION INTERNAL FIXATION) FRACTURE: ICD-10-CM

## 2018-12-11 PROCEDURE — 97110 THERAPEUTIC EXERCISES: CPT

## 2018-12-11 PROCEDURE — 97116 GAIT TRAINING THERAPY: CPT

## 2018-12-11 NOTE — PROGRESS NOTES
Daily Note     Today's date: 2018  Patient name: Kimmie Pedroza  : 1945  MRN: [id number]  Referring provider: Nithin Blount MD  Dx:   Encounter Diagnosis     ICD-10-CM    1  Fracture T14  8XXA    2  S/P ORIF (open reduction internal fixation) fracture Z96 7     Z87 81                   Subjective:  Patient reported she is walking at home with walker with 50% WB on R LE  Pt reported that R hip has been bothering her at time though  Pt reported that when she dangle her RLE down with knee bent her foot would still blue nut not as much as other times  Objective: See treatment diary below  Precautions 50% weightbearing R LE    Specialty Daily Treatment Diary       Manual     Scar massage Taught for HEP       PROM   15' 15'             Exercise Diary                 Seated gastroc stretch 3x:10 3x :10 nt 3x :10    Ankle AROM 10x 20x each 20x ea  30x            Ankle circles  20x 20x 30x    Ankle TB all directions  20x red  No TB with EV 20x red  No TB with EV 15x  RTB    Seated HR/TR  20x nt 20x            SLR flexion  20x 20x 3x10     S/l hip abd  Standing- 20x Standing 20x     Bridges with CAM boot  20x 20x 3x10            Gait training with 50% WB, RW  10'  15' 15' ~ 60 ft x1, 30ft x2                                                            Modalities        CP PRN                          Assessment: Progressed number or reps with exercises performed  No increase in pain or difficulty  Increased distance with RW and 50%WB on R LE to 60ft x1, 30 ft x2, proper form and technique was applied  Pt educated to continue with that she is doing  Tolerated treatment well  Patient demonstrated fatigue post treatment, exhibited good technique with therapeutic exercises and would benefit from continued PT to improve function of RLE  Plan: Continue per plan of care  Progress treatment as tolerated  with following MD protocol

## 2018-12-11 NOTE — PROGRESS NOTES
CM called and spoke with patient, who reportes, she is ambulating 50%weight bearing with camboot to right foot, full weight bearing to left foot, using walker with ambulation  Patient reported she is able to contribute with food preparation, can cook the food  She is able to do small household chores  Patent stated pain level has decreased to 3, taking tylenol with good effect  CM made patient aware bundle episode closes 12/18, CM will call again, and encouraged patient to call CM with any issues or concerns

## 2018-12-13 ENCOUNTER — OFFICE VISIT (OUTPATIENT)
Dept: PHYSICAL THERAPY | Age: 73
End: 2018-12-13
Payer: MEDICARE

## 2018-12-13 DIAGNOSIS — T14.8XXA FRACTURE: Primary | ICD-10-CM

## 2018-12-13 DIAGNOSIS — Z98.890 S/P ORIF (OPEN REDUCTION INTERNAL FIXATION) FRACTURE: ICD-10-CM

## 2018-12-13 DIAGNOSIS — Z87.81 S/P ORIF (OPEN REDUCTION INTERNAL FIXATION) FRACTURE: ICD-10-CM

## 2018-12-13 PROCEDURE — 97116 GAIT TRAINING THERAPY: CPT

## 2018-12-13 PROCEDURE — 97110 THERAPEUTIC EXERCISES: CPT

## 2018-12-13 NOTE — PROGRESS NOTES
Daily Note     Today's date: 2018  Patient name: Elis Dawson  : 1945  MRN: [id number]  Referring provider: Marco A Gil MD  Dx:   Encounter Diagnosis     ICD-10-CM    1  Fracture T14  8XXA    2  S/P ORIF (open reduction internal fixation) fracture Z96 7     Z87 81                   Subjective:  Patient reported she is walking at home with walker with 50% WB on R LE  Pt reported that R hip has been bothering her at time though  Pt reported that when she dangle her RLE down with knee bent her foot would still blue nut not as much as other times  Objective: See treatment diary below  Precautions 50% weightbearing R LE    Specialty Daily Treatment Diary       Manual    Scar massage Taught for HEP       PROM   15' 15'  10'           Exercise Diary                 Seated gastroc stretch 3x:10 3x :10 nt 3x :10 3x :20   Ankle AROM 10x 20x each 20x ea  30x 30x           Ankle circles  20x 20x 30x 15x   Ankle TB all directions  20x red  No TB with EV 20x red  No TB with EV 15x  RTB 30x RTB   Seated HR/TR  20x nt 20x 30x            SLR flexion  20x 20x 3x10  3x10   S/l hip abd  Standing- 20x Standing 20x  Standing R only 3x10   Bridges with CAM boot  20x 20x 3x10 3x10           Gait training with 50% WB, RW  10'  15' 15' ~ 60 ft x1, 30ft x2 10'                                                           Modalities        CP PRN                          Assessment: Tolerated treatment well  Patient demonstrated fatigue post treatment, exhibited good technique with therapeutic exercises and would benefit from continued PT to improve function of RLE  Pt still following MD precautions as of 50% WB on R side  Pt reported minimal discomfort in medial side of ankle  Plan: Continue per plan of care  Progress treatment as tolerated  with following MD protocol

## 2018-12-18 ENCOUNTER — OFFICE VISIT (OUTPATIENT)
Dept: PHYSICAL THERAPY | Age: 73
End: 2018-12-18
Payer: MEDICARE

## 2018-12-18 ENCOUNTER — TELEPHONE (OUTPATIENT)
Dept: OBGYN CLINIC | Facility: HOSPITAL | Age: 73
End: 2018-12-18

## 2018-12-18 DIAGNOSIS — T14.8XXA FRACTURE: Primary | ICD-10-CM

## 2018-12-18 DIAGNOSIS — Z98.890 S/P ORIF (OPEN REDUCTION INTERNAL FIXATION) FRACTURE: ICD-10-CM

## 2018-12-18 DIAGNOSIS — Z87.81 S/P ORIF (OPEN REDUCTION INTERNAL FIXATION) FRACTURE: ICD-10-CM

## 2018-12-18 PROCEDURE — 97110 THERAPEUTIC EXERCISES: CPT

## 2018-12-18 PROCEDURE — G8978 MOBILITY CURRENT STATUS: HCPCS | Performed by: PHYSICAL THERAPIST

## 2018-12-18 PROCEDURE — G8979 MOBILITY GOAL STATUS: HCPCS | Performed by: PHYSICAL THERAPIST

## 2018-12-18 NOTE — PROGRESS NOTES
Daily Note     Today's date: 2018  Patient name: Casey Triplett  : 1945  MRN: [id number]  Referring provider: Margaux Ball MD  Dx:   Encounter Diagnosis     ICD-10-CM    1  Fracture T14  8XXA    2  S/P ORIF (open reduction internal fixation) fracture Z96 7     Z87 81                   Subjective:  Patient reported no increase in pain on R ankle  Pt still using CAM boot with all weightbearing activities  Ambulation with FWW for only short distances, pt still using WC for longer ambulation distances  Pt  reported that he called the MD and he said that they will be calling evaluating therapist      Objective: See treatment diary below  Precautions 50% weightbearing R LE    Specialty Daily Treatment Diary       Manual    Scar massage        PROM  10' 15' 15'  10'           Exercise Diary                 Seated gastroc stretch :20 3x 3x :10 nt 3x :10 3x :20   Ankle AROM 30x 20x each 20x ea  30x 30x           Ankle circles 30x 20x 20x 30x 15x   Ankle TB all directions 30x RTB 20x red  No TB with EV 20x red  No TB with EV 15x  RTB 30x RTB   Seated HR/TR 30x 20x nt 20x 30x            SLR flexion 3x10 20x 20x 3x10  3x10   S/l hip abd nt Standing- 20x Standing 20x  Standing R only 3x10   Bridges with CAM boot 30x 20x 20x 3x10 3x10   Clam shells with CAM boot 2x10       Gait training with 50% WB, RW nt 10'  15' 15' ~ 60 ft x1, 30ft x2 10'           squats 2x10 with Ue support                                               Modalities        CP PRN                          Assessment: Tolerated treatment well  Patient demonstrated fatigue post treatment, exhibited good technique with therapeutic exercises and would benefit from continued PT to improve function of RLE with ROM and strength  Pt receieved a RTB for use during HEP program  Pt unable to performed S/L due to pain in L hip, instead added supine clam shells with RTB with CAM boot         Plan: Continue per plan of care  Progress treatment as tolerated  with following MD protocol

## 2018-12-18 NOTE — TELEPHONE ENCOUNTER
Spoke with [facility]  Patient is to remain 50% weight bearing on affected extremity with CAM boot  She has appointment with us next week, and further instructions will be provided at that time   Thanks

## 2018-12-18 NOTE — TELEPHONE ENCOUNTER
Patient's  called in and said that the PT, Juanpablo Cannon has been trying to reach Dr Aruna Perdomo to see what is the next steps for her physical therapy  Please call the PT at    said that Dr Aruna Perdomo said 2 weeks of weigh baring on the leg and then next steps   said they have been trying to get the doctor for the last 2 weeks  Please advise        Callback# for Jack Gustafson [phone]    Dr Aruna Perdomo

## 2018-12-20 ENCOUNTER — APPOINTMENT (OUTPATIENT)
Dept: PHYSICAL THERAPY | Age: 73
End: 2018-12-20
Payer: MEDICARE

## 2018-12-24 ENCOUNTER — OFFICE VISIT (OUTPATIENT)
Dept: OBGYN CLINIC | Facility: HOSPITAL | Age: 73
End: 2018-12-24
Payer: MEDICARE

## 2018-12-24 ENCOUNTER — HOSPITAL ENCOUNTER (OUTPATIENT)
Dept: RADIOLOGY | Facility: HOSPITAL | Age: 73
Discharge: HOME | End: 2018-12-24
Attending: ORTHOPAEDIC SURGERY
Payer: MEDICARE

## 2018-12-24 VITALS — SYSTOLIC BLOOD PRESSURE: 134 MMHG | HEART RATE: 98 BPM | DIASTOLIC BLOOD PRESSURE: 79 MMHG

## 2018-12-24 DIAGNOSIS — Z87.81 S/P ORIF (OPEN REDUCTION INTERNAL FIXATION) FRACTURE: ICD-10-CM

## 2018-12-24 DIAGNOSIS — M25.571 PAIN, JOINT, ANKLE AND FOOT, RIGHT: ICD-10-CM

## 2018-12-24 DIAGNOSIS — M25.571 PAIN, JOINT, ANKLE AND FOOT, RIGHT: Primary | ICD-10-CM

## 2018-12-24 DIAGNOSIS — Z98.890 S/P ORIF (OPEN REDUCTION INTERNAL FIXATION) FRACTURE: ICD-10-CM

## 2018-12-24 PROCEDURE — 99213 OFFICE O/P EST LOW 20 MIN: CPT | Performed by: ORTHOPAEDIC SURGERY

## 2018-12-24 PROCEDURE — 73610 X-RAY EXAM OF ANKLE: CPT

## 2018-12-24 NOTE — PROGRESS NOTES
Assessment/Plan:    Patient seen examined Dr Cornelius Ca  She is over three months status post right ankle ORIF, and doing well  X-rays in the office today show continued healing with unchanged alignment of hardware  Clinically she is doing well  She can now progress to weight-bearing as tolerated right lower extremity  She was advised to continue the CAM walker for another week and thereafter transition to sneakers  Follow-up in one month for re-evaluation  Call the office if any questions or concerns  Problem List Items Addressed This Visit     S/P ORIF (open reduction internal fixation) fracture      Other Visit Diagnoses     Pain, joint, ankle and foot, right    -  Primary    Relevant Orders    XR ankle 3+ vw right            Subjective:      Patient ID: Solomon Persaud is a 68 y o  female  HPI     Patient is a 78-year-old female presents for follow-up visit  She is over three months status post ORIF right ankle on 9/7/2018  Since last visit she has maintained 50% weight-bearing on the affected extremity and has been doing well  She feels like she is progressing with physical therapy  She has minimal pain in the right lower extremity  She does admit to intermittent swelling  No chest pain shortness of breath or issues with her incision  She offers no new complaints at today's visit  The following portions of the patient's history were reviewed and updated as appropriate: allergies, current medications, past family history, past medical history, past social history, past surgical history and problem list     Review of Systems   Constitutional: Negative for chills, diaphoresis, fatigue and fever  Respiratory: Negative  Cardiovascular: Negative  Musculoskeletal: Positive for joint swelling  Skin: Negative  Neurological: Positive for weakness  Negative for numbness           Objective:      /79   Pulse 98          Physical Exam   Constitutional: She is oriented to person, place, and time  She appears well-developed and well-nourished  No distress  HENT:   Head: Normocephalic and atraumatic  Musculoskeletal: She exhibits edema  She exhibits no tenderness  Neurological: She is alert and oriented to person, place, and time  Skin: Skin is warm and dry  She is not diaphoretic  Psychiatric: She has a normal mood and affect  Her behavior is normal    Nursing note and vitals reviewed  No acute distress  Right lower extremity  Medial and lateral ankle incisions are well healed without signs dehiscence infection  There is minimal swelling about the right ankle  No calf tenderness  She is able to plantar and dorsiflex the ankle without pain or restriction    Positive EHL FHL  Toes are warm sensate and mobile

## 2018-12-27 ENCOUNTER — EVALUATION (OUTPATIENT)
Dept: PHYSICAL THERAPY | Age: 73
End: 2018-12-27
Payer: MEDICARE

## 2018-12-27 DIAGNOSIS — Z87.81 S/P ORIF (OPEN REDUCTION INTERNAL FIXATION) FRACTURE: Primary | ICD-10-CM

## 2018-12-27 DIAGNOSIS — Z98.890 S/P ORIF (OPEN REDUCTION INTERNAL FIXATION) FRACTURE: Primary | ICD-10-CM

## 2018-12-27 PROCEDURE — 97110 THERAPEUTIC EXERCISES: CPT | Performed by: PHYSICAL THERAPIST

## 2018-12-27 PROCEDURE — G8978 MOBILITY CURRENT STATUS: HCPCS | Performed by: PHYSICAL THERAPIST

## 2018-12-27 PROCEDURE — 97116 GAIT TRAINING THERAPY: CPT | Performed by: PHYSICAL THERAPIST

## 2018-12-27 PROCEDURE — G8979 MOBILITY GOAL STATUS: HCPCS | Performed by: PHYSICAL THERAPIST

## 2018-12-27 NOTE — PROGRESS NOTES
PT Re-Evaluation     Today's date: 2018  Patient name: Gilda Mills  : 1945  MRN: [id number]  Referring provider: Toni Smith MD  Dx:   Encounter Diagnosis     ICD-10-CM    1  S/P ORIF (open reduction internal fixation) fracture Z96 7     Z87 81                   Assessment  Assessment details: Patient seen for re-assessment  PT initiated gait training with WBAT with CAM boot, with RW and trialed SPC  Taught patient how to ascend/descend the stairs with step to pattern, using B handrails; unsafe to use the [facility] on the stairs  Patient without ankle pain during gait training and stair training; unsafe to ambulate with SPC outside of therapy d/t R LE weakness, specifically the knee  PT recommended patient to start incorporating daily activiites (washing dishes, folding laundry, etc) to encourage functional strengthening in leg as well as to progress patient from RW  Recommended to start weaning herself from using the wheelchair for community ambulation  Patient very deconditioned, short of breath with walking 50+ feet in the gym; able to recover with standing rest  Encouraged walking shorter distances (ie x 10-15 min) 3x/day to build strength and endurance  Patient is compliant with HEP, is making progress with ankle strengthening, still limited with LE strength related to using the wheelchair  Impairments: abnormal gait, abnormal or restricted ROM, impaired balance, impaired physical strength, lacks appropriate home exercise program and weight-bearing intolerance  Functional limitations: Ambulation restricted due to [place] [facility] precaution  Symptom irritability: lowUnderstanding of Dx/Px/POC: good  Goals  Impairment Goals to be met within 4 weeks  -Improve ankle DF ROM to 10 degrees MET  -Improve ankle Eversion ROM to 18 degrees NOT MET  -Increase ankle strength to 5/5 throughout PARTIALLY MET  - Increase B LE to 5/5 throughout PARTIALLY MET      Functional Goals to be met within 4 weeks    -Return to Prior Level of Function PARTIALLY MET  -Increase Functional Status Measure to: Above initial PARTIALLY MET  -Patient will be independent with HEP PARTIALLY MET  -Wean from assisted device when applicable PARTIALLY MET  - Gait training with CAM boot with WB precautions  MET      Plan  Patient would benefit from: skilled physical therapy  Planned modality interventions: thermotherapy: hydrocollator packs and cryotherapy  Planned therapy interventions: balance, balance/weight bearing training, coordination, flexibility, functional ROM exercises, gait training, home exercise program, graded exercise, therapeutic exercise, therapeutic activities, stretching, strengthening, postural training, patient education, neuromuscular re-education, abdominal trunk stabilization and manual therapy  Frequency: 2x week  Duration in weeks: 4  Treatment plan discussed with: patient and family        Subjective Evaluation    History of Present Illness  Date of surgery: 2018  Mechanism of injury: dislocation and trauma  Mechanism of injury: Patient reports a change in WB status to WBAT with CAM boot x 2 weeks (until ) then able to d/c CAM boot  Patient continues to be very fearful of stairs and walking with RW with WBAT d/t h/o fall     Not a recurrent problem   Quality of life: fair    Pain  Current pain ratin  At best pain ratin  At worst pain ratin  Location: R ankle  Quality: dull ache  Aggravating factors: standing and walking  Progression: no change    Social Support  Steps to enter house: yes  Stairs in house: no   Lives in: [place] house  Lives with: spouse    Employment status: not working    Diagnostic Tests  X-ray: abnormal  Treatments  Previous treatment: immobilization  Patient Goals  Patient goals for therapy: decreased edema, decreased pain, improved balance, increased motion, increased strength and independence with ADLs/IADLs          Objective     Static Posture     Ankle/Foot   Ankle/Foot (Right): Hallux valgus       Observations     Additional Observation Details  Right foot with red coloration that reduces with soft tissue mobilization    Active Range of Motion   Left Ankle/Foot   Dorsiflexion (ke): 5 degrees   Plantar flexion: 65 degrees   Inversion: 30 degrees   Eversion: 18 degrees     Right Ankle/Foot   Dorsiflexion (ke): 0 degrees   Plantar flexion: 52 degrees   Inversion: 13 degrees   Eversion: 7 degrees     Strength/Myotome Testing     Left Hip   Planes of Motion   Flexion: 4-  Abduction: 4-    Right Hip   Planes of Motion   Flexion: 4-  Abduction: 4-    Left Knee   Flexion: 4-  Extension: 4-    Right Knee   Flexion: 4-  Extension: 4-    Left Ankle/Foot   Dorsiflexion: 4-  Plantar flexion: 4  Inversion: 3+  Eversion: 4    Right Ankle/Foot   Dorsiflexion: 4-  Plantar flexion: 4-  Inversion: 3+  Eversion: 3+    Swelling   Left Ankle/Foot   Figure 8: 48 cm    Right Ankle/Foot   Figure 8: 54 cm    Ambulation   Weight-Bearing Status   Weight-Bearing Status (Right): partial weight-bearing    Assistive device used: wheelchair and walker          Precautions WBAT R LE with CAM boot  D/c CAM boot 1/7/19    Specialty Daily Treatment Diary       Manual 12/27        FOTO TODAY       PROM  NV x 10'               Exercise Diary                 Seated gastroc stretch Self 5x:20       Ankle AROM HEP               Ankle circles HEP       Ankle TB all directions Green NV       Seated HR/TR 20x                SLR flexion 2# 20x each (with CAM boot)       Standing hip abd 3# NV - 20x       Bridges with CAM boot 20x               Gait training with WBAT RW 1 gym lap, close S assist, resting as needed               squats To hi/lo table       Standing hip 3 way, B NV       Standing ham curls, B NV       Step ups, FW NV 6"                       Modalities        CP PRN

## 2018-12-31 ENCOUNTER — OFFICE VISIT (OUTPATIENT)
Dept: PHYSICAL THERAPY | Age: 73
End: 2018-12-31
Payer: MEDICARE

## 2018-12-31 DIAGNOSIS — Z87.81 S/P ORIF (OPEN REDUCTION INTERNAL FIXATION) FRACTURE: Primary | ICD-10-CM

## 2018-12-31 DIAGNOSIS — Z98.890 S/P ORIF (OPEN REDUCTION INTERNAL FIXATION) FRACTURE: Primary | ICD-10-CM

## 2018-12-31 DIAGNOSIS — T14.8XXA FRACTURE: ICD-10-CM

## 2018-12-31 PROCEDURE — 97110 THERAPEUTIC EXERCISES: CPT | Performed by: PHYSICAL THERAPIST

## 2018-12-31 PROCEDURE — 97140 MANUAL THERAPY 1/> REGIONS: CPT | Performed by: PHYSICAL THERAPIST

## 2018-12-31 NOTE — PROGRESS NOTES
Daily Note     Today's date: 2018  Patient name: Aldo Oscar  : 1945  MRN: [id number]  Referring provider: Jude Mooney MD  Dx:   Encounter Diagnosis     ICD-10-CM    1  S/P ORIF (open reduction internal fixation) fracture Z96 7     Z87 81    2  Fracture T14  8XXA                   Subjective: Patient reports no difficulty with ascending/descending the stair since last session  Reports no ankle pain, has been walking in the boot consistently at home  Has a few questions about stretching program at home, thinks that she confused the stretching and strengthening with the theraband  No pain in ankle  Objective: See treatment diary below    Precautions WBAT R LE with CAM boot  D/c CAM boot 19    Specialty Daily Treatment Diary       Manual        FOTO TODAY       gastroc stretch NV 5'      PROM ankle- anterior tib, inv, ev directions  5'      Exercise Diary                 Seated gastroc stretch Self 5x:20 Self 5x:20 R knee extended      Ankle AROM HEP               Ankle circles HEP       Ankle TB all directions Green NV Green 20x      Seated HR/TR 20x  20x              SLR flexion 2# 20x each (with CAM boot) 2# 20x (CAM boot off)      Standing hip abd 3# NV - 20x 2# 20x, each, ballet bar      Bridges with CAM boot 20x NT              Gait training with WBAT RW 1 gym lap, close S assist, resting as needed NT              squats To hi/lo table       Standing hip 3 way, B NV -      Standing ham curls, B NV -      Step ups, FW NV 6" // bars 20x FW- R LE only (ascend 1st, descend L LE 1st)      Step ups lateral  NV              Modalities        CP PRN                                Assessment: Reviewed HEP with patient, recommended to stretch/strengthen 3x/day at least to prepare patient for gait training without CAM boot  Focusing on progressing LE strength in R LE to again prepare for gait without CAM boot  Patient significantly weak in R LE, lacks muscle endurance strength  Fatigued with program today  Tight in ankle in all directions  Plan to progress ankle ROM, passively to prepare for d/c of CAM boot  Plan: Continue per plan of care  Progress as able  Focus on LE strengthening, progressing ankle ROM

## 2019-01-03 ENCOUNTER — OFFICE VISIT (OUTPATIENT)
Dept: PHYSICAL THERAPY | Age: 74
End: 2019-01-03
Payer: MEDICARE

## 2019-01-03 DIAGNOSIS — T14.8XXA FRACTURE: ICD-10-CM

## 2019-01-03 DIAGNOSIS — Z87.81 S/P ORIF (OPEN REDUCTION INTERNAL FIXATION) FRACTURE: Primary | ICD-10-CM

## 2019-01-03 DIAGNOSIS — Z98.890 S/P ORIF (OPEN REDUCTION INTERNAL FIXATION) FRACTURE: Primary | ICD-10-CM

## 2019-01-03 PROCEDURE — 97140 MANUAL THERAPY 1/> REGIONS: CPT

## 2019-01-03 PROCEDURE — 97110 THERAPEUTIC EXERCISES: CPT

## 2019-01-03 NOTE — PROGRESS NOTES
Daily Note     Today's date: 1/3/2019  Patient name: Artis Chapman  : 1945  MRN: [id number]  Referring provider: Lendel Carrel, MD  Dx:   Encounter Diagnosis     ICD-10-CM    1  S/P ORIF (open reduction internal fixation) fracture Z96 7     Z87 81    2  Fracture T14  8XXA                   Subjective: Patient reports ankle feels pretty good no difficulty with performing stairs with B UE support  Pt reported yesterday at the  she felt like her R ankle/ CAM boot got stuck at the bottom of the chair and felt like she twisted it a little  Pt reported no increase in pain since that incident  Objective: See treatment diary below    Precautions WBAT R LE with CAM boot  D/c CAM boot 19    Specialty Daily Treatment Diary       Manual 12/27 12/31 1/3      FOTO TODAY       gastroc stretch NV 5' 5'     PROM ankle- anterior tib, inv, ev directions  5' 5'     Exercise Diary                 Seated gastroc stretch Self 5x:20 Self 5x:20 R knee extended Self 5x :20       Ankle AROM HEP                Ankle circles HEP       Ankle TB all directions Green NV Green 20x Green 20x     Seated HR/TR 20x  20x 20x             SLR flexion 2# 20x each (with CAM boot) 2# 20x (CAM boot off) 2# 20x     Standing hip abd 3# NV - 20x 2# 20x, each, ballet bar 20x ea   Bridges with CAM boot 20x NT 20x without boot             Gait training with WBAT RW 1 gym lap, close S assist, resting as needed NT 4 laps // bars single UE support L side  No P!              squats To hi/lo table  nt     Standing hip 3 way, B NV - 20x ea  Standing ham curls, B NV - NV     Step ups, FW NV 6" // bars 20x FW- R LE only (ascend 1st, descend L LE 1st) 6" // bars 20x FW- R LE only (ascend 1st, descend L LE 1st)     Step ups lateral  NV NV             Modalities        CP PRN                                Assessment: Progressed patient to unilateral L UE support in // bars in prep for ambulation with cane in near future visits  Pt able to perform with no increase in pain and little postural swaying  Muscle soreness in B hips post treatment session  No c/o increase in R ankle pain  PROM limited in DF  Focused on DF  Plan: Continue per plan of care  Progress as able  Lilo Dumas

## 2019-01-08 ENCOUNTER — OFFICE VISIT (OUTPATIENT)
Dept: PHYSICAL THERAPY | Age: 74
End: 2019-01-08
Payer: MEDICARE

## 2019-01-08 DIAGNOSIS — Z87.81 S/P ORIF (OPEN REDUCTION INTERNAL FIXATION) FRACTURE: Primary | ICD-10-CM

## 2019-01-08 DIAGNOSIS — Z98.890 S/P ORIF (OPEN REDUCTION INTERNAL FIXATION) FRACTURE: Primary | ICD-10-CM

## 2019-01-08 DIAGNOSIS — T14.8XXA FRACTURE: ICD-10-CM

## 2019-01-08 PROCEDURE — 97140 MANUAL THERAPY 1/> REGIONS: CPT

## 2019-01-08 PROCEDURE — 97110 THERAPEUTIC EXERCISES: CPT

## 2019-01-08 NOTE — PROGRESS NOTES
Daily Note     Today's date: 2019  Patient name: Kezia Chen  : 1945  MRN: [id number]  Referring provider: Saad Mendez MD  Dx:   No diagnosis found  Subjective: 4/10 R ankle  Pt reported she took her boot off yesterday  And reported that she sometimes feels sore  Mostly dull pain, pt reported yesterday having one sharp pain  Objective: See treatment diary below    Precautions WBAT R LE with CAM boot  D/c CAM boot 19    Specialty Daily Treatment Diary       Manual 12/27 12/31 1/3 1/8    FOTO every 4 visits  FOTO today      gastroc stretch NV 5' 5' 5'    PROM ankle- anterior tib, inv, ev directions  5' 5' 5'    Exercise Diary                 Seated gastroc stretch Self 5x:20 Self 5x:20 R knee extended Self 5x :20   Self 5x :20    Ankle AROM HEP                Ankle circles HEP       Ankle TB all directions Green NV Green 20x Green 20x Green 20x    Seated HR/TR 20x  20x 20x             SLR flexion 2# 20x each (with CAM boot) 2# 20x (CAM boot off) 2# 20x 2# 20x    Standing hip abd 3# NV - 20x 2# 20x, each, ballet bar 20x ea   2# 20x ea  Bridges with CAM boot 20x NT 20x without boot 20x            Gait training with WBAT RW 1 gym lap, close S assist, resting as needed NT 4 laps // bars single UE support L side  No P!  4 laps // bars  UE L side no P! Gait training L SPC        squats To hi/lo table  nt nt    Standing hip 3 way, B NV - 20x ea  20x 2#    Standing ham curls, B NV - NV 2# x20    Step ups, FW NV 6" // bars 20x FW- R LE only (ascend 1st, descend L LE 1st) 6" // bars 20x FW- R LE only (ascend 1st, descend L LE 1st) 6" x 5    Step ups lateral  NV NV NV            Modalities        CP PRN                                Assessment: Progressed patient  With stretching exercises 2# with standing exercises  Pt reported post treatment session she was very sore  Pt educated that she can gradually lean her way out of the cam boot   Pt educated to ice her ankle when she gets home to decrease discomfort  Plan: Continue per plan of care  Progress as able

## 2019-01-10 ENCOUNTER — OFFICE VISIT (OUTPATIENT)
Dept: PHYSICAL THERAPY | Age: 74
End: 2019-01-10
Payer: MEDICARE

## 2019-01-10 DIAGNOSIS — Z98.890 S/P ORIF (OPEN REDUCTION INTERNAL FIXATION) FRACTURE: Primary | ICD-10-CM

## 2019-01-10 DIAGNOSIS — Z87.81 S/P ORIF (OPEN REDUCTION INTERNAL FIXATION) FRACTURE: Primary | ICD-10-CM

## 2019-01-10 DIAGNOSIS — T14.8XXA FRACTURE: ICD-10-CM

## 2019-01-10 PROCEDURE — 97116 GAIT TRAINING THERAPY: CPT | Performed by: PHYSICAL THERAPIST

## 2019-01-10 PROCEDURE — 97110 THERAPEUTIC EXERCISES: CPT | Performed by: PHYSICAL THERAPIST

## 2019-01-10 NOTE — PROGRESS NOTES
Daily Note     Today's date: 1/10/2019  Patient name: Filemon Zamora  : 1945  MRN: [id number]  Referring provider: Ciarra Leon MD  Dx:   Encounter Diagnosis     ICD-10-CM    1  S/P ORIF (open reduction internal fixation) fracture Z96 7     Z87 81    2  Fracture T14  8XXA                   Subjective: Patient not feeling so well today, feeling a little nauseous  Patient wanting to stay to finish therapy session  Patient continues to report pulling sensation at medial ankle, talked with patient about possible scar tissue tightness causing pain in addition to hardware  Objective: See treatment diary below    Precautions WBAT R LE with CAM boot  D/c CAM boot 19    Specialty Daily Treatment Diary       Manual 12/27 12/31 1/3 1/8 1/10   FOTO every 4 visits  FOTO today   FOTO today   gastroc stretch NV 5' 5' 5' 5'   PROM ankle- anterior tib, inv, ev directions  5' 5' 5' 5'   Exercise Diary                 Seated gastroc stretch Self 5x:20 Self 5x:20 R knee extended Self 5x :20   Self 5x :20 -   Ankle AROM HEP                Ankle circles HEP       Ankle TB all directions Green NV Green 20x Green 20x Green 20x Blue 20x   Seated HR/TR 20x  20x 20x             SLR flexion 2# 20x each (with CAM boot) 2# 20x (CAM boot off) 2# 20x 2# 20x 2 5# 20x   Standing hip abd 3# NV - 20x 2# 20x, each, ballet bar 20x ea   2# 20x ea  -   Bridges  20x NT 20x without boot 20x 20x           Gait training with WBAT RW 1 gym lap, close S assist, resting as needed NT 4 laps // bars single UE support L side  No P!  4 laps // bars  UE L side no P! 4 laps // bars, UE L side   Gait training L SPC        squats To hi/lo table  nt nt    Standing hip 3 way, B NV - 20x ea    20x 2# NT   Standing ham curls, B NV - NV 2# x20 nt   Step ups, FW NV 6" // bars 20x FW- R LE only (ascend 1st, descend L LE 1st) 6" // bars 20x FW- R LE only (ascend 1st, descend L LE 1st) 6" x 5 trialed steps with SPC today to simulate home environment Step ups lateral  NV NV NV            Modalities        CP PRN                                Assessment: Progressed patient with exercise program, added blue TB for HEP  Kinesiotaped ankle for support, educated about possible signs of skin irritation and how to remove  Trialed steps with SPC today, with single handrail, reviewed correct technique  Trialed ambulating short distance (ie 20') with SPC and without assisted device  Significantly improved push off when not using SPC  Plan to trial walking without SPC in // bars or with gait belt in future, stopped session d/t patient not feeling well  Recommended patient to rest      Plan: Continue per plan of care  Progress as able

## 2019-01-15 ENCOUNTER — OFFICE VISIT (OUTPATIENT)
Dept: PHYSICAL THERAPY | Age: 74
End: 2019-01-15
Payer: MEDICARE

## 2019-01-15 DIAGNOSIS — Z98.890 S/P ORIF (OPEN REDUCTION INTERNAL FIXATION) FRACTURE: Primary | ICD-10-CM

## 2019-01-15 DIAGNOSIS — Z87.81 S/P ORIF (OPEN REDUCTION INTERNAL FIXATION) FRACTURE: Primary | ICD-10-CM

## 2019-01-15 DIAGNOSIS — T14.8XXA FRACTURE: ICD-10-CM

## 2019-01-15 PROCEDURE — 97110 THERAPEUTIC EXERCISES: CPT

## 2019-01-15 PROCEDURE — 97116 GAIT TRAINING THERAPY: CPT

## 2019-01-15 NOTE — PROGRESS NOTES
Daily Note     Today's date: 1/15/2019  Patient name: Beth Castellano  : 1945  MRN: [id number]  Referring provider: Chanda Lee MD  Dx:   Encounter Diagnosis     ICD-10-CM    1  S/P ORIF (open reduction internal fixation) fracture Z96 7     Z87 81    2  Fracture T14  8XXA                   Subjective: Pt reported that she no longer using the wheel chair at home but still takes rest breaks after standing for a long period of time  Pt reported feeling sore today in the R ankle  Objective: See treatment diary below    Precautions WBAT R LE with CAM boot  D/c CAM boot 19    Specialty Daily Treatment Diary       Manual 12/27 1/15      FOTO every 4 visits  Last 1/10        gastroc stretch NV 5'      PROM ankle- anterior tib, inv, ev directions        Exercise Diary                 Seated gastroc stretch Self 5x:20 5x :20      Ankle AROM HEP HEP              Ankle circles HEP HEP      Ankle TB all directions Green NV Blue 20x      Seated HR/TR 20x                SLR flexion 2# 20x each (with CAM boot) 2 5# 20x      Standing hip abd 3# NV - 20x 2 5# 20x      Bridges  20x nt              Gait training with WBAT RW 1 gym lap, close S assist, resting as needed 2 laps with [facility] supervision as needed, 1 short lap without AD  Gait training L SPC        squats To hi/lo table       Standing hip 3 way, B NV 2 5# 2x10      Standing ham curls, B NV nt      Step ups, FW NV nt      Step ups lateral  nt              Modalities        CP PRN                                Assessment: Pt completed all exercises without any increase in pain  Pt reported some episodes of dizziness when looking at the floor while ambulation  No LOB wit ambulation step through gait pattern with SPC with no compensation techniques  Pt on the cautious side and reported that she would like to try ambulating with the can for a little longer than using no AD  Gradually getting stronger in R ankle and B hips       Plan: Continue per plan of care  Progress as able

## 2019-01-17 ENCOUNTER — OFFICE VISIT (OUTPATIENT)
Dept: PHYSICAL THERAPY | Age: 74
End: 2019-01-17
Payer: MEDICARE

## 2019-01-17 DIAGNOSIS — Z87.81 S/P ORIF (OPEN REDUCTION INTERNAL FIXATION) FRACTURE: Primary | ICD-10-CM

## 2019-01-17 DIAGNOSIS — T14.8XXA FRACTURE: ICD-10-CM

## 2019-01-17 DIAGNOSIS — Z98.890 S/P ORIF (OPEN REDUCTION INTERNAL FIXATION) FRACTURE: Primary | ICD-10-CM

## 2019-01-17 PROCEDURE — 97110 THERAPEUTIC EXERCISES: CPT

## 2019-01-17 PROCEDURE — 97116 GAIT TRAINING THERAPY: CPT

## 2019-01-17 NOTE — PROGRESS NOTES
Daily Note     Today's date: 2019  Patient name: Tristan Zimmerman  : 1945  MRN: [id number]  Referring provider: Patsy Santamaria MD  Dx:   Encounter Diagnosis     ICD-10-CM    1  S/P ORIF (open reduction internal fixation) fracture Z96 7     Z87 81    2  Fracture T14  8XXA                   Subjective: Pt  Reported that she returns to the MD on 19  Pt reported no pain today prior to treatment  Objective: See treatment diary below    Precautions WBAT R LE with CAM boot  D/c CAM boot 19    Specialty Daily Treatment Diary       Manual 12/27 1/15 1/17     FOTO every 4 visits  Last 1/10     foto please - thanks  gastroc stretch NV 5' Performed  PROM ankle- anterior tib, inv, ev directions        Exercise Diary                 Seated gastroc stretch Self 5x:20 5x :20 5x :20     Ankle AROM HEP HEP              Ankle circles HEP HEP      Ankle TB all directions Green NV Blue 20x Blue 20x     Seated HR/TR 20x                SLR flexion 2# 20x each (with CAM boot) 2 5# 20x 2 5# 20x     Standing hip abd 3# NV - 20x 2 5# 20x 2 5# 20x     Bridges  20x nt 20x             Gait training with WBAT RW 1 gym lap, close S assist, resting as needed 2 laps with [facility] supervision as needed, 1 short lap without AD   2 laps with SPC on L supervision      squats To hi/lo table  20x no Ue support  Standing hip 3 way, B NV 2 5# 2x10 2 5# 2x10     Standing ham curls, B NV nt 2 5# 2x10     Step ups, FW NV nt 8" /6" causes R hip P! 10x     Step ups lateral  nt 2x P!               Modalities        CP PRN                                Assessment: Increased difficulty with IV with BTB  Pt developed muscle soreness with hip and R ankle  Pt was unable to perform multiple stairs due to increased pain in R hip  Pt reported her R hip has been hurting her  Overall patient is making steady progress and able to ambulate with supervision/ MI with SPC on L side  No compensating methods noticed       Plan: Continue per plan of care  Progress as able

## 2019-01-21 ENCOUNTER — OFFICE VISIT (OUTPATIENT)
Dept: OBGYN CLINIC | Facility: HOSPITAL | Age: 74
End: 2019-01-21
Payer: MEDICARE

## 2019-01-21 ENCOUNTER — HOSPITAL ENCOUNTER (OUTPATIENT)
Dept: RADIOLOGY | Facility: HOSPITAL | Age: 74
Discharge: HOME | End: 2019-01-21
Attending: ORTHOPAEDIC SURGERY
Payer: MEDICARE

## 2019-01-21 VITALS
BODY MASS INDEX: 26.33 KG/M2 | HEART RATE: 87 BPM | DIASTOLIC BLOOD PRESSURE: 84 MMHG | WEIGHT: 158 LBS | SYSTOLIC BLOOD PRESSURE: 130 MMHG | HEIGHT: 65 IN

## 2019-01-21 DIAGNOSIS — S82.891A CLOSED FRACTURE OF RIGHT ANKLE, INITIAL ENCOUNTER: ICD-10-CM

## 2019-01-21 DIAGNOSIS — Z98.890 S/P ORIF (OPEN REDUCTION INTERNAL FIXATION) FRACTURE: ICD-10-CM

## 2019-01-21 DIAGNOSIS — M25.571 PAIN, JOINT, ANKLE AND FOOT, RIGHT: Primary | ICD-10-CM

## 2019-01-21 DIAGNOSIS — Z87.81 S/P ORIF (OPEN REDUCTION INTERNAL FIXATION) FRACTURE: ICD-10-CM

## 2019-01-21 DIAGNOSIS — M25.571 PAIN, JOINT, ANKLE AND FOOT, RIGHT: ICD-10-CM

## 2019-01-21 PROCEDURE — 99213 OFFICE O/P EST LOW 20 MIN: CPT | Performed by: ORTHOPAEDIC SURGERY

## 2019-01-21 PROCEDURE — 73610 X-RAY EXAM OF ANKLE: CPT

## 2019-01-21 NOTE — PROGRESS NOTES
Assessment/Plan:      Patient seen and examined with Dr Halley Aguilar  She is status post right ankle ORIF done on 9/7/2018  Since last visit, she was progressed to [facility]  X-rays in the office today demonstrate symmetric mortise joint  There is delayed radiographic healing medially  Overall, she is doing very well and has no current complaints today  She has progressed very well with physical therapy over the last four weeks  She can now follow up on a p r n  Basis  She is instructed to call the office in the future if she has any questions or concerns  She can drive from our standpoint  Problem List Items Addressed This Visit     Closed fracture of right ankle    S/P ORIF (open reduction internal fixation) fracture      Other Visit Diagnoses     Pain, joint, ankle and foot, right    -  Primary    Relevant Orders    XR ankle 3+ vw right            Subjective:      Patient ID: Alphonzo Dancer is a 68 y o  female  HPI     The patient is a 61-year-old female presents for follow-up appointment  She is status post right ankle ORIF done on 9/7/2018  At last visit, she was progress to weight-bearing as tolerated RLE  Today, she states over the last month she is doing very well and progressing with physical therapy  She has had no major issues with putting full weight down on the right lower extremity  She does experience soreness after physical therapy but has little pain in the right ankle  No numbness and tingling no issues with her incisions  The following portions of the patient's history were reviewed and updated as appropriate: allergies, current medications, past family history, past medical history, past social history, past surgical history and problem list     Review of Systems   Constitutional: Negative for chills, diaphoresis, fatigue and fever  Respiratory: Negative  Cardiovascular: Negative  Genitourinary: Negative  Musculoskeletal: Negative for gait problem  Skin: Negative for color change, rash and wound  Neurological: Negative for weakness and numbness  All other systems reviewed and are negative  Objective:      /84   Pulse 87   Ht 5' 5" (1 651 m)   Wt 71 7 kg (158 lb)   BMI 26 29 kg/m²          Physical Exam   Constitutional: She is oriented to person, place, and time  She appears well-developed and well-nourished  No distress  HENT:   Head: Normocephalic and atraumatic  Eyes: Right eye exhibits no discharge  Pulmonary/Chest: Effort normal  No stridor  No respiratory distress  Musculoskeletal: She exhibits tenderness  Neurological: She is alert and oriented to person, place, and time  Skin: Skin is warm and dry  She is not diaphoretic  Psychiatric: She has a normal mood and affect  Nursing note and vitals reviewed  No acute distress  Right lower extremity:  Medial and lateral ankle incisions are well healed without signs of infection or dehiscence  No swelling evident today  No calf tenderness  She has full range of motion the ankle joint without restriction or pain  Toes are warm sensate and mobile

## 2019-01-22 ENCOUNTER — OFFICE VISIT (OUTPATIENT)
Dept: PHYSICAL THERAPY | Age: 74
End: 2019-01-22
Payer: MEDICARE

## 2019-01-22 DIAGNOSIS — Z87.81 S/P ORIF (OPEN REDUCTION INTERNAL FIXATION) FRACTURE: Primary | ICD-10-CM

## 2019-01-22 DIAGNOSIS — T14.8XXA FRACTURE: ICD-10-CM

## 2019-01-22 DIAGNOSIS — Z98.890 S/P ORIF (OPEN REDUCTION INTERNAL FIXATION) FRACTURE: Primary | ICD-10-CM

## 2019-01-22 PROCEDURE — 97110 THERAPEUTIC EXERCISES: CPT | Performed by: PHYSICAL THERAPIST

## 2019-01-22 NOTE — PROGRESS NOTES
Daily Note     Today's date: 2019  Patient name: Casey Triplett  : 1945  MRN: [id number]  Referring provider: Margaux Ball MD  Dx:   Encounter Diagnosis     ICD-10-CM    1  S/P ORIF (open reduction internal fixation) fracture Z96 7     Z87 81    2  Fracture T14  8XXA                   Subjective: Patient has since seen ortho  Was released  Patient to be away x months in [facility], wanting to discharge PT today  Objective: See treatment diary below    Precautions WBAT R LE with CAM boot  D/c CAM boot 19    Specialty Daily Treatment Diary       Manual 12/27 1/15 1/17 1/22    FOTO every 4 visits  Last 1/10    FOTO TODAY    gastroc stretch NV 5' Performed  5'    PROM ankle- anterior tib, inv, ev directions        Exercise Diary                 Seated gastroc stretch Self 5x:20 5x :20 5x :20 5x:20    Ankle AROM HEP HEP              Ankle circles HEP HEP      Ankle TB all directions Green NV Blue 20x Blue 20x Blue 20x    Seated HR/TR 20x                SLR flexion 2# 20x each (with CAM boot) 2 5# 20x 2 5# 20x 2 5# 20x    Standing hip abd 3# NV - 20x 2 5# 20x 2 5# 20x 2 5# 20x    Bridges  20x nt 20x -            Gait training with WBAT RW 1 gym lap, close S assist, resting as needed 2 laps with [facility] supervision as needed, 1 short lap without AD   2 laps with SPC on L supervision  -    squats To hi/lo table  20x no Ue support  -    Standing hip 3 way, B NV 2 5# 2x10 2 5# 2x10 2 5# 20x    Standing ham curls, B NV nt 2 5# 2x10 2 5# 20x    Step ups, FW NV nt 8" /6" causes R hip P! 10x -    Step ups lateral  nt 2x P!   -        Standing HR/TR 10x    Modalities        CP PRN                                Assessment: Progressed HEP and reviewed full HEP  Patient independent with use of SPC  Patient feeling ready to discharge today  Reviewed home set up in [facility]   Recommended patient to continue with exercises, to start exercising (walking or water aerobics - no jumping) to stay active and maintain gains made during PT  Educated and recommended patient to follow up with PT in [facility] if needed  Patient feeling confident with walking with SPC  No questions  Ready for discharge today      Plan: d/c PT

## 2019-01-24 ENCOUNTER — APPOINTMENT (OUTPATIENT)
Dept: PHYSICAL THERAPY | Age: 74
End: 2019-01-24
Payer: MEDICARE

## 2019-08-02 ENCOUNTER — TELEPHONE (OUTPATIENT)
Dept: OBGYN CLINIC | Facility: HOSPITAL | Age: 74
End: 2019-08-02

## 2019-08-02 NOTE — TELEPHONE ENCOUNTER
Caller: Patient  Call Back Number: [phone]  Provider: Dr Tarun Hernandez    Patient has called and would like to ask if she will need an prescription for anti-biotic before her dentist appointment on Thursday August 8th      Please advise, thank you

## 2019-08-02 NOTE — TELEPHONE ENCOUNTER
Spoke with patient and let her know she does not need abx from our standpoint prior to dental procedure   She is aware- no further action needed

## 2021-03-18 NOTE — PLAN OF CARE
Problem: OCCUPATIONAL THERAPY ADULT  Goal: Performs self-care activities at highest level of function for planned discharge setting  See evaluation for individualized goals  Treatment Interventions: ADL retraining, Functional transfer training, Endurance training, Patient/family training, Equipment evaluation/education, Compensatory technique education, Activityengagement          See flowsheet documentation for full assessment, interventions and recommendations  Limitation: Decreased ADL status, Decreased Safe judgement during ADL, Decreased endurance, Decreased self-care trans, Decreased high-level ADLs  Prognosis: Good  Assessment: Pt is a 68 y o  female who was admitted to [facility] on 9/6/2018 with Closed fracture of right ankle2* fall s/p ORIF   Pt's problem list also includes PMH of HTN, COPD and hypothyroid,   At baseline pt was completing adls and mobility independently  Pt lives with spouse in 2 story home with first floor setup - [address]  Currently pt requires moderate assist for overall ADLS and min assist for functional mobility/transfers  Pt currently presents with impairments in the following categories -steps to enter environment, limited home support, difficulty performing ADLS, difficulty performing IADLS  and health management  activity tolerance, endurance, standing balance/tolerance and safety   These impairments, as well as pt's fatigue, pain, orthopedic restricitions , WBS , decreased caregiver support and risk for falls  limit pt's ability to safely engage in all baseline areas of occupation, includingbathing, dressing, toileting, functional mobility/transfers, community mobility, house maintenance, meal prep, cleaning, social participation  and leisure activities  From OT standpoint, recommend home with family support pending progress - if pt continue to require physical assist may need to consider STR as spouse is unable to provide assist upon D/C   OT will continue to follow to address the below stated goals        OT Discharge Recommendation: Home with family support 49

## 2021-06-30 ENCOUNTER — TELEPHONE (OUTPATIENT)
Dept: GASTROENTEROLOGY | Facility: CLINIC | Age: 76
End: 2021-06-30

## 2021-06-30 NOTE — TELEPHONE ENCOUNTER
Received message from , Linette Dempsey yesterday at 3:23pm asking for a call back to schedule himself and his wife  Pt is due for a recall colon with Dr Olaf Mathis for hx of colon polyps  I returned husbands call, lmom asking him to please call back

## 2021-07-01 ENCOUNTER — PREP FOR PROCEDURE (OUTPATIENT)
Dept: GASTROENTEROLOGY | Facility: CLINIC | Age: 76
End: 2021-07-01

## 2021-07-01 DIAGNOSIS — Z86.010 HISTORY OF COLON POLYPS: Primary | ICD-10-CM

## 2021-08-03 RX ORDER — OLMESARTAN MEDOXOMIL 20 MG/1
20 TABLET ORAL DAILY
COMMUNITY
Start: 2021-04-29

## 2021-08-03 RX ORDER — UMECLIDINIUM 62.5 UG/1
AEROSOL, POWDER ORAL
COMMUNITY
Start: 2021-05-18

## 2021-08-04 ENCOUNTER — ANESTHESIA EVENT (OUTPATIENT)
Dept: GASTROENTEROLOGY | Facility: AMBULATORY SURGERY CENTER | Age: 76
End: 2021-08-04

## 2021-08-04 ENCOUNTER — HOSPITAL ENCOUNTER (OUTPATIENT)
Dept: GASTROENTEROLOGY | Facility: AMBULATORY SURGERY CENTER | Age: 76
Discharge: HOME | End: 2021-08-04
Payer: MEDICARE

## 2021-08-04 ENCOUNTER — ANESTHESIA (OUTPATIENT)
Dept: GASTROENTEROLOGY | Facility: AMBULATORY SURGERY CENTER | Age: 76
End: 2021-08-04

## 2021-08-04 VITALS
HEIGHT: 64 IN | HEART RATE: 68 BPM | BODY MASS INDEX: 27.31 KG/M2 | SYSTOLIC BLOOD PRESSURE: 142 MMHG | OXYGEN SATURATION: 98 % | DIASTOLIC BLOOD PRESSURE: 78 MMHG | TEMPERATURE: 97 F | WEIGHT: 160 LBS | RESPIRATION RATE: 18 BRPM

## 2021-08-04 DIAGNOSIS — Z86.010 HISTORY OF COLON POLYPS: ICD-10-CM

## 2021-08-04 PROCEDURE — 99100 ANES PT EXTEME AGE<1 YR&>70: CPT | Performed by: NURSE ANESTHETIST, CERTIFIED REGISTERED

## 2021-08-04 PROCEDURE — 00811 ANES LWR INTST NDSC NOS: CPT | Performed by: NURSE ANESTHETIST, CERTIFIED REGISTERED

## 2021-08-04 PROCEDURE — 88305 TISSUE EXAM BY PATHOLOGIST: CPT | Performed by: PATHOLOGY

## 2021-08-04 PROCEDURE — 45385 COLONOSCOPY W/LESION REMOVAL: CPT | Performed by: INTERNAL MEDICINE

## 2021-08-04 RX ORDER — SODIUM CHLORIDE 9 MG/ML
30 INJECTION, SOLUTION INTRAVENOUS CONTINUOUS
Status: DISCONTINUED | OUTPATIENT
Start: 2021-08-04 | End: 2021-08-08 | Stop reason: HOSPADM

## 2021-08-04 RX ORDER — SODIUM CHLORIDE 9 MG/ML
INJECTION, SOLUTION INTRAVENOUS CONTINUOUS PRN
Status: DISCONTINUED | OUTPATIENT
Start: 2021-08-04 | End: 2021-08-04

## 2021-08-04 RX ORDER — PROPOFOL 10 MG/ML
INJECTION, EMULSION INTRAVENOUS AS NEEDED
Status: DISCONTINUED | OUTPATIENT
Start: 2021-08-04 | End: 2021-08-04

## 2021-08-04 RX ORDER — SODIUM CHLORIDE 9 MG/ML
20 INJECTION, SOLUTION INTRAVENOUS CONTINUOUS
Status: DISCONTINUED | OUTPATIENT
Start: 2021-08-04 | End: 2021-08-08 | Stop reason: HOSPADM

## 2021-08-04 RX ORDER — ONDANSETRON 2 MG/ML
INJECTION INTRAMUSCULAR; INTRAVENOUS AS NEEDED
Status: DISCONTINUED | OUTPATIENT
Start: 2021-08-04 | End: 2021-08-04

## 2021-08-04 RX ADMIN — PROPOFOL 30 MG: 10 INJECTION, EMULSION INTRAVENOUS at 10:12

## 2021-08-04 RX ADMIN — PROPOFOL 50 MG: 10 INJECTION, EMULSION INTRAVENOUS at 10:19

## 2021-08-04 RX ADMIN — PROPOFOL 70 MG: 10 INJECTION, EMULSION INTRAVENOUS at 10:09

## 2021-08-04 RX ADMIN — ONDANSETRON 4 MG: 2 INJECTION INTRAMUSCULAR; INTRAVENOUS at 10:07

## 2021-08-04 RX ADMIN — SODIUM CHLORIDE: 9 INJECTION, SOLUTION INTRAVENOUS at 09:44

## 2021-08-04 RX ADMIN — PROPOFOL 50 MG: 10 INJECTION, EMULSION INTRAVENOUS at 10:16

## 2021-08-04 RX ADMIN — PROPOFOL 50 MG: 10 INJECTION, EMULSION INTRAVENOUS at 10:24

## 2021-08-04 NOTE — DISCHARGE INSTRUCTIONS
Colonoscopy   WHAT YOU NEED TO KNOW:   A colonoscopy is a procedure to examine the inside of your colon (intestine) with a scope  Polyps or tissue growths may have been removed during your colonoscopy  It is normal to feel bloated and to have some abdominal discomfort  You should be passing gas  If you have hemorrhoids or you had polyps removed, you may have a small amount of bleeding  DISCHARGE INSTRUCTIONS:   Seek care immediately if:    You have sudden, severe abdominal pain   You have problems swallowing   You have a large amount of black, sticky bowel movements or blood in your bowel movements   You have sudden trouble breathing   You feel weak, lightheaded, or faint or your heart beats faster than normal for you  Contact your healthcare provider if:    You have a fever and chills   You have nausea or are vomiting   Your abdomen is bloated or feels full and hard   You have abdominal pain   You have black, sticky bowel movements or blood in your bowel movements   You have not had a bowel movement for 3 days after your procedure   You have rash or hives   You have questions or concerns about your procedure  Activity:    Do not lift, strain, or run for 24 hours after your procedure   Rest after your procedure  You have been given medicine to relax you  Do not drive or make important decisions until the day after your procedure  Return to your normal activity as directed   Relieve gas and discomfort from bloating by lying on your right side with a heating pad on your abdomen  You may need to take short walks to help the gas move out  Eat small meals until bloating is relieved  Follow up with your healthcare provider as directed: Write down your questions so you remember to ask them during your visits  If you take a blood thinner, please review the specific instructions from your endoscopist about when you should resume it   These can be found in the Recommendation and Your Medication list sections of this After Visit Summary  Diverticulosis   WHAT YOU NEED TO KNOW:   What is diverticulosis? Diverticulosis is a condition that causes small pockets called diverticula to form in your intestine  These pockets make it difficult for bowel movements to pass through your digestive system  What causes diverticulosis? Diverticula form when muscles have to work hard to move bowel movements through the intestine  The force causes bulges to form at weak areas in the intestine  This may happen if you eat foods that are low in fiber  Fiber helps give your bowel movements more bulk so they are larger and easier to move through your colon  The following may increase your risk of diverticulosis:  · A history of constipation    · Age 36 or older    · Obesity    · Lack of exercise    What are the signs and symptoms of diverticulosis? Diverticulosis usually does not cause any signs or symptoms  It may cause any of the following in some people:  · Pain or discomfort in your lower abdomen    · Abdominal bloating    · Constipation or diarrhea    How is diverticulosis diagnosed? Your healthcare provider will examine you and ask about your bowel movements, diet, and symptoms  He or she will also ask about any medical conditions you have or medicines you take  You may need any of the following:  · Blood tests  may be done to check for signs of inflammation  · A barium enema  is an x-ray of your colon that may show diverticula  A tube is put into your anus, and a liquid called barium is put through the tube  Barium is used so that healthcare providers can see your colon more clearly  · Flexible sigmoidoscopy  is a test to look for any changes in your lower intestines and rectum  It may also show the cause of any bleeding or pain  A soft, bendable tube with a light on the end will be put into your anus  It will then be moved forward into your intestine  · A colonoscopy  is used to look at your whole colon  A scope (long bendable tube with a light on the end) is used to take pictures  This test may show diverticula  · A CT scan , or CAT scan, may show diverticula  You may be given contrast liquid before the scan  Tell the healthcare provider if you have ever had an allergic reaction to contrast liquid  How is diverticulosis managed? The goal of treatment is to manage any symptoms you have and prevent other problems such as diverticulitis  Diverticulitis is swelling or infection of the diverticula  Your healthcare provider may recommend any of the following:  · Eat a variety of high-fiber foods  High-fiber foods help you have regular bowel movements  High-fiber foods include cooked beans, fruits, vegetables, and some cereals  Most adults need 25 to 35 grams of fiber each day  Your healthcare provider may recommend that you have more  Ask your healthcare provider how much fiber you need  Increase fiber slowly  You may have abdominal discomfort, bloating, and gas if you add fiber to your diet too quickly  You may need to take a fiber supplement if you are not getting enough fiber from food  · Medicines  to soften your bowel movements may be given  You may also need medicines to treat symptoms such as bloating and pain  · Drink liquids as directed  You may need to drink 2 to 3 liters (8 to 12 cups) of liquids every day  Ask your healthcare provider how much liquid to drink each day and which liquids are best for you  · Apply heat  on your abdomen for 20 to 30 minutes every 2 hours for as many days as directed  Heat helps decrease pain and muscle spasms  How can I help prevent diverticulitis or other symptoms? The following may help decrease your risk for diverticulitis or symptoms, such as bleeding  Talk to your provider about these or other things you can do to prevent problems that may occur with diverticulosis  · Exercise regularly  Ask your healthcare provider about the best exercise plan for you  Exercise can help you have regular bowel movements  Get 30 minutes of exercise on most days of the week  · Maintain a healthy weight  Ask your healthcare provider how much you should weigh  Ask him or her to help you create a weight loss plan if you are overweight  · Do not smoke  Nicotine and other chemicals in cigarettes increase your risk for diverticulitis  Ask your healthcare provider for information if you currently smoke and need help to quit  E-cigarettes or smokeless tobacco still contain nicotine  Talk to your healthcare provider before you use these products  · Ask your healthcare provider if it is safe to take NSAIDs  NSAIDs may increase your risk of diverticulitis  When should I seek immediate care? · You have severe pain on the left side of your lower abdomen  · Your bowel movements are bright or dark red  When should I contact my healthcare provider? · You have a fever and chills  · You feel dizzy or lightheaded  · You have nausea, or you are vomiting  · You have a change in your bowel movements  · You have questions or concerns about your condition or care  CARE AGREEMENT:   You have the right to help plan your care  Learn about your health condition and how it may be treated  Discuss treatment options with your healthcare providers to decide what care you want to receive  You always have the right to refuse treatment  The above information is an  only  It is not intended as medical advice for individual conditions or treatments  Talk to your doctor, nurse or pharmacist before following any medical regimen to see if it is safe and effective for you  © Copyright [employer] 2021 Information is for End User's use only and may not be sold, redistributed or otherwise used for commercial purposes   All illustrations and images included in CareNotes® are the copyrighted property of [employer]  or [facility] Fiber Diet   AMBULATORY CARE:   A high-fiber diet  includes foods that have a high amount of fiber  Fiber is the part of fruits, vegetables, and grains that is not broken down by your body  Fiber keeps your bowel movements regular  Fiber can also help lower your cholesterol level, control blood sugar in people with diabetes, and relieve constipation  Fiber can also help you control your weight because it helps you feel full faster  Most adults should eat 25 to 35 grams of fiber each day  Talk to your dietitian or healthcare provider about the amount of fiber you need  Good sources of fiber:       · Foods with at least 4 grams of fiber per serving:      ? ? to ½ cup of high-fiber cereal (check the nutrition label on the box)    ? ½ cup of blackberries or raspberries    ? 4 dried prunes    ? 1 cooked artichoke    ? ½ cup of cooked legumes, such as lentils, or red, kidney, and danielle beans    · Foods with 1 to 3 grams of fiber per serving:      ? 1 slice of whole-wheat, pumpernickel, or rye bread    ? ½ cup of cooked brown rice    ? 4 whole-wheat crackers    ? 1 cup of oatmeal    ? ½ cup of cereal with 1 to 3 grams of fiber per serving (check the nutrition label on the box)    ? 1 small piece of fruit, such as an apple, banana, pear, kiwi, or orange    ? 3 dates    ? ½ cup of canned apricots, fruit cocktail, peaches, or pears    ? ½ cup of raw or cooked vegetables, such as carrots, cauliflower, cabbage, spinach, squash, or corn  Ways that you can increase fiber in your diet:   · Choose brown or wild rice instead of white rice  · Use whole wheat flour in recipes instead of white or all-purpose flour  · Add beans and peas to casseroles or soups  · Choose fresh fruit and vegetables with peels or skins on instead of juices  Other diet guidelines to follow:   · Add fiber to your diet slowly    You may have abdominal discomfort, bloating, and gas if you add fiber to your diet too quickly  · Drink plenty of liquids as you add fiber to your diet  You may have nausea or develop constipation if you do not drink enough water  Ask how much liquid to drink each day and which liquids are best for you  © Copyright [employer] 2021 Information is for End User's use only and may not be sold, redistributed or otherwise used for commercial purposes  All illustrations and images included in CareNotes® are the copyrighted property of [employer] M [employer] Inc  or Eliot Mckenna  The above information is an  only  It is not intended as medical advice for individual conditions or treatments  Talk to your doctor, nurse or pharmacist before following any medical regimen to see if it is safe and effective for you  Colorectal Polyps   WHAT YOU NEED TO KNOW:   What are colorectal polyps? Colorectal polyps are small growths of tissue in the lining of the colon and rectum  Most polyps are usually benign (not cancer)  Certain types of polyps, called adenomatous polyps, may turn into cancer  What increases my risk for colorectal polyps? The exact cause of colorectal polyps is unknown  The following may increase your risk:  · Older age    · Foods high in fat and low in fiber    · Family history of polyps    · Intestinal diseases, such as Crohn disease or ulcerative colitis    · Smoking cigarettes or drinking alcohol    · Lack of physical activity, such as exercise    · Obesity    What are the signs and symptoms of colorectal polyps? · Blood in your bowel movement or bleeding from the rectum    · Change in bowel movement habits, such as diarrhea or constipation    · Abdominal pain    How are colorectal polyps diagnosed? You should have fecal blood screening 1 time each year for colorectal disease if you are older than 50 years  You should be screened earlier if you have an intestinal disease or a family history of polyps or colorectal cancer   During this screening, a sample of your bowel movement is checked for blood, which may be an early sign of colorectal polyps or cancer  You may also need any of the following tests:  · A digital rectal exam  means your healthcare provider will use a finger to check for polyps  · A barium enema  is an x-ray of the colon  A tube is put into your anus, and a liquid called barium is put through the tube  Barium is used so that healthcare providers can see your colon better on the x-ray film  · A virtual colonoscopy  is a CT scan that takes pictures of the inside of your colon and rectum  A small, flexible tube is put into your rectum and air or carbon dioxide (gas) is used to expand your colon  This lets healthcare providers clearly see your colon and any polyps on a monitor  · Colonoscopy or sigmoidoscopy  are procedures to help your healthcare provider see the inside of your colon  He or she will use a flexible tube with a small light and camera on the end  During a sigmoidoscopy, your healthcare provider will only look at rectum and lower colon  During a colonoscopy, he or she will look at the full length of your colon  A small amount of tissue may be removed from your colon for tests  How are colorectal polyps treated? Small, benign polyps may not need treatment  Your healthcare provider will check the polyp over time to make sure it is not changing  Polyps that are cancer may be removed with one of the following:  · A polypectomy  is a minimally invasive procedure to remove a polyp during a colonoscopy or sigmoidoscopy  Your healthcare provider may need to remove the polyp with a laparoscope  Laparoscopy is done by inserting a small, flexible scope into incisions made on your abdomen  · Surgery  may be needed to remove large or deep polyps that cannot be removed in a polypectomy  Tissues or lymph nodes near a polyp may also be removed  This helps stop cancer from spreading  What can I do to lower my risk for colorectal polyps? · Eat a variety of healthy foods  Healthy foods include fruit, vegetables, whole-grain breads, low-fat dairy products, beans, lean meat, and fish  Ask if you need to be on a special diet  · Maintain a healthy weight  Ask your healthcare provider what a healthy weight is for you  Ask him or her to help with a weight loss plan if you are overweight  · Exercise as directed  Begin to exercise slowly and do more as you get stronger  Talk with your healthcare provider before you start an exercise program          · Limit alcohol  Your risk for polyps increases the more you drink  · Do not smoke  Nicotine and other chemicals in cigarettes and cigars increase your risk for polyps  Ask your healthcare provider for information if you currently smoke and need help to quit  E-cigarettes or smokeless tobacco still contain nicotine  Talk to your healthcare provider before you use these products  Where can I find more information? · Kavitha Bell ([facility])  [address], [place] [address]  Phone: [phone]  Web Address: Rosemary Shaver  [facility] gov    Call your local emergency number (911 in the [address]) if:   · You have sudden shortness of breath  · You have a fast heart rate, fast breathing, or are too dizzy to stand up  When should I seek immediate care? · You have severe abdominal pain  · You see blood in your bowel movement  When should I call my doctor? · You have a fever  · You have chills, a cough, or feel weak and achy  · You have abdominal pain that does not go away or gets worse after you take medicine  · Your abdomen is swollen  · You are losing weight without trying  · You have questions or concerns about your condition or care  CARE AGREEMENT:   You have the right to help plan your care  Learn about your health condition and how it may be treated   Discuss treatment options with your healthcare providers to decide what care you want to receive  You always have the right to refuse treatment  The above information is an  only  It is not intended as medical advice for individual conditions or treatments  Talk to your doctor, nurse or pharmacist before following any medical regimen to see if it is safe and effective for you  © Copyright [employer] 2021 Information is for End User's use only and may not be sold, redistributed or otherwise used for commercial purposes   All illustrations and images included in CareNotes® are the copyrighted property of A D A M , Inc  or [address] [place] [address]

## 2021-08-04 NOTE — ANESTHESIA PREPROCEDURE EVALUATION
Procedure:  COLONOSCOPY    Relevant Problems   CARDIO   (+) Other hyperlipidemia      ENDO   (+) Other specified hypothyroidism      NEURO/PSYCH   (+) S/P ORIF (open reduction internal fixation) fracture      PULMONARY   (+) COPD (chronic obstructive pulmonary disease) (HCC)        Physical Exam    Airway    Mallampati score: I  TM Distance: >3 FB  Neck ROM: full     Dental   No notable dental hx     Cardiovascular  Rhythm: regular, Rate: normal, Cardiovascular exam normal    Pulmonary  Pulmonary exam normal Breath sounds clear to auscultation,     Other Findings        Anesthesia Plan  ASA Score- 2     Anesthesia Type- IV sedation with anesthesia with ASA Monitors  Additional Monitors:   Airway Plan:           Plan Factors-Exercise tolerance (METS): >4 METS  Chart reviewed  EKG reviewed  Imaging results reviewed  Existing labs reviewed  Patient summary reviewed  Induction- intravenous  Postoperative Plan-     Informed Consent- Anesthetic plan and risks discussed with patient

## 2021-08-04 NOTE — H&P
History and Physical - [facility]  Clementina Hammer 77 y o  female MRN: [id number]                  HPI: Clementina Hammer is a 77y o  year old female who presents for history of polyps      REVIEW OF SYSTEMS: Per the HPI, and otherwise unremarkable  Historical Information   Past Medical History:   Diagnosis Date    Colon polyp     COPD (chronic obstructive pulmonary disease) ([facility] [address] )     Disease of thyroid gland     Hyperlipidemia     Hypertension     Macular degeneration 08/04/2021     Past Surgical History:   Procedure Laterality Date    COLONOSCOPY      HYSTERECTOMY      ORIF TIBIA & FIBULA FRACTURES Right 9/7/2018    Procedure: OPEN REDUCTION W/ INTERNAL FIXATION (ORIF) ANKLE;  Surgeon: Mela Howard MD;  Location: [facility];  Service: Orthopedics    TONSILLECTOMY       Social History   Social History     Substance and Sexual Activity   Alcohol Use No     Social History     Substance and Sexual Activity   Drug Use No     Social History     Tobacco Use   Smoking Status Former Smoker   Smokeless Tobacco Never Used     History reviewed  No pertinent family history      Meds/Allergies       Current Outpatient Medications:     acetaminophen (TYLENOL) 325 mg tablet    cholecalciferol (VITAMIN D3) 1,000 units tablet    denosumab (PROLIA) 60 mg/mL    levothyroxine 50 mcg tablet    olmesartan (BENICAR) 20 mg tablet    simvastatin (ZOCOR) 20 mg tablet    umeclidinium bromide (Incruse Ellipta) 62 5 mcg/inh AEPB inhaler    Current Facility-Administered Medications:     sodium chloride 0 9 % infusion, 30 mL/hr, Intravenous, Continuous    sodium chloride 0 9 % infusion, 20 mL/hr, Intravenous, Continuous    Facility-Administered Medications Ordered in Other Encounters:     sodium chloride 0 9 % infusion, , Intravenous, Continuous PRN, New Bag at 08/04/21 0944    Allergies   Allergen Reactions    Sulfa Antibiotics     Azithromycin Rash       Objective     /79   Pulse 101   Temp (!) 97 °F (36 1 °C) (Skin)   Resp 18   Ht 5' 4" (1 626 m)   Wt 72 6 kg (160 lb)   SpO2 96%   BMI 27 46 kg/m²       PHYSICAL EXAM    Gen: NAD  Head: NCAT  CV: RRR  CHEST: Clear  ABD: soft, NT/ND  EXT: no edema      ASSESSMENT/PLAN:  This is a 77y o  year old female here for colonoscopy, and she is stable and optimized for her procedure

## 2022-10-17 ENCOUNTER — APPOINTMENT (RX ONLY)
Dept: URBAN - METROPOLITAN AREA CLINIC 129 | Facility: CLINIC | Age: 77
Setting detail: DERMATOLOGY
End: 2022-10-17

## 2022-10-17 DIAGNOSIS — L82.1 OTHER SEBORRHEIC KERATOSIS: ICD-10-CM

## 2022-10-17 DIAGNOSIS — L57.0 ACTINIC KERATOSIS: ICD-10-CM | Status: INADEQUATELY CONTROLLED

## 2022-10-17 DIAGNOSIS — L29.89 OTHER PRURITUS: ICD-10-CM | Status: INADEQUATELY CONTROLLED

## 2022-10-17 PROBLEM — L29.8 OTHER PRURITUS: Status: ACTIVE | Noted: 2022-10-17

## 2022-10-17 PROCEDURE — ? COUNSELING

## 2022-10-17 PROCEDURE — 17000 DESTRUCT PREMALG LESION: CPT

## 2022-10-17 PROCEDURE — ? PRESCRIPTION

## 2022-10-17 PROCEDURE — ? PRESCRIPTION MEDICATION MANAGEMENT

## 2022-10-17 PROCEDURE — 99203 OFFICE O/P NEW LOW 30 MIN: CPT | Mod: 25

## 2022-10-17 PROCEDURE — ? LIQUID NITROGEN

## 2022-10-17 RX ORDER — HYDROCORTISONE 25 MG/G
1 CREAM TOPICAL BID
Qty: 453.6 | Refills: 0 | Status: ERX | COMMUNITY
Start: 2022-10-17

## 2022-10-17 RX ADMIN — HYDROCORTISONE 1: 25 CREAM TOPICAL at 00:00

## 2022-10-17 ASSESSMENT — LOCATION DETAILED DESCRIPTION DERM
LOCATION DETAILED: SUPERIOR THORACIC SPINE
LOCATION DETAILED: NASAL DORSUM
LOCATION DETAILED: UPPER STERNUM
LOCATION DETAILED: LEFT SUPERIOR MEDIAL UPPER BACK

## 2022-10-17 ASSESSMENT — LOCATION ZONE DERM
LOCATION ZONE: TRUNK
LOCATION ZONE: NOSE

## 2022-10-17 ASSESSMENT — LOCATION SIMPLE DESCRIPTION DERM
LOCATION SIMPLE: CHEST
LOCATION SIMPLE: NOSE
LOCATION SIMPLE: LEFT UPPER BACK
LOCATION SIMPLE: UPPER BACK

## 2022-10-17 NOTE — PROCEDURE: PRESCRIPTION MEDICATION MANAGEMENT
Plan: Gentle body lotion and moisturizer.
Render In Strict Bullet Format?: No
Detail Level: Zone
Initiate Treatment: Hydrocortisone creambid x 2 weeks.

## 2022-10-17 NOTE — PROCEDURE: LIQUID NITROGEN
Detail Level: Detailed
Render Post-Care Instructions In Note?: no
Number Of Freeze-Thaw Cycles: 1 freeze-thaw cycle
Show Applicator Variable?: Yes
Application Tool (Optional): Liquid Nitrogen Sprayer
Post-Care Instructions: I reviewed with the patient in detail post-care instructions. Patient is to wear sunprotection, and avoid picking at any of the treated lesions. Pt may apply Vaseline to crusted or scabbing areas.
Duration Of Freeze Thaw-Cycle (Seconds): 10
Consent: The patient's consent was obtained including but not limited to risks of crusting, scabbing, blistering, scarring, darker or lighter pigmentary change, recurrence, incomplete removal and infection.

## 2022-11-21 ENCOUNTER — APPOINTMENT (RX ONLY)
Dept: URBAN - METROPOLITAN AREA CLINIC 129 | Facility: CLINIC | Age: 77
Setting detail: DERMATOLOGY
End: 2022-11-21

## 2022-11-21 DIAGNOSIS — L29.89 OTHER PRURITUS: ICD-10-CM | Status: WELL CONTROLLED

## 2022-11-21 DIAGNOSIS — L57.0 ACTINIC KERATOSIS: ICD-10-CM | Status: RESOLVED

## 2022-11-21 PROBLEM — L29.8 OTHER PRURITUS: Status: ACTIVE | Noted: 2022-11-21

## 2022-11-21 PROCEDURE — ? COUNSELING

## 2022-11-21 PROCEDURE — 99213 OFFICE O/P EST LOW 20 MIN: CPT

## 2022-11-21 PROCEDURE — ? PRESCRIPTION MEDICATION MANAGEMENT

## 2022-11-21 ASSESSMENT — LOCATION SIMPLE DESCRIPTION DERM
LOCATION SIMPLE: UPPER BACK
LOCATION SIMPLE: NOSE

## 2022-11-21 ASSESSMENT — LOCATION ZONE DERM
LOCATION ZONE: TRUNK
LOCATION ZONE: NOSE

## 2022-11-21 ASSESSMENT — LOCATION DETAILED DESCRIPTION DERM
LOCATION DETAILED: SUPERIOR THORACIC SPINE
LOCATION DETAILED: NASAL DORSUM

## 2022-11-21 NOTE — PROCEDURE: PRESCRIPTION MEDICATION MANAGEMENT
Plan: Gentle body lotion and moisturizer.
Render In Strict Bullet Format?: No
Detail Level: Zone
Initiate Treatment: Itch relief cream otc. \\nAntihistamine tabs or gel

## 2023-07-19 ENCOUNTER — APPOINTMENT (OUTPATIENT)
Dept: RADIOLOGY | Facility: AMBULARY SURGERY CENTER | Age: 78
End: 2023-07-19
Attending: ORTHOPAEDIC SURGERY
Payer: MEDICARE

## 2023-07-19 ENCOUNTER — OFFICE VISIT (OUTPATIENT)
Dept: OBGYN CLINIC | Facility: CLINIC | Age: 78
End: 2023-07-19
Payer: MEDICARE

## 2023-07-19 VITALS
WEIGHT: 160 LBS | DIASTOLIC BLOOD PRESSURE: 80 MMHG | BODY MASS INDEX: 27.31 KG/M2 | HEIGHT: 64 IN | SYSTOLIC BLOOD PRESSURE: 133 MMHG | HEART RATE: 82 BPM

## 2023-07-19 DIAGNOSIS — T84.84XA PAINFUL ORTHOPAEDIC HARDWARE (HCC): ICD-10-CM

## 2023-07-19 DIAGNOSIS — Z01.89 ENCOUNTER FOR LOWER EXTREMITY COMPARISON IMAGING STUDY: ICD-10-CM

## 2023-07-19 DIAGNOSIS — S86.111A RUPTURE OF RIGHT POSTERIOR TIBIALIS TENDON, INITIAL ENCOUNTER: Primary | ICD-10-CM

## 2023-07-19 DIAGNOSIS — M25.571 PAIN, JOINT, ANKLE AND FOOT, RIGHT: ICD-10-CM

## 2023-07-19 PROCEDURE — 99203 OFFICE O/P NEW LOW 30 MIN: CPT | Performed by: ORTHOPAEDIC SURGERY

## 2023-07-19 PROCEDURE — 73610 X-RAY EXAM OF ANKLE: CPT

## 2023-07-19 PROCEDURE — 73630 X-RAY EXAM OF FOOT: CPT

## 2023-07-19 PROCEDURE — 73600 X-RAY EXAM OF ANKLE: CPT

## 2023-07-19 NOTE — PROGRESS NOTES
Kin Lozano M.D. Attending, Orthopaedic Surgery  [facility] and [facility]        ORTHOPAEDIC FOOT AND ANKLE CLINIC VISIT     Assessment:     Encounter Diagnoses   Name Primary? • Rupture of right posterior tibialis tendon, initial encounter Yes   • Painful orthopaedic hardware [facility])    • Encounter for lower extremity comparison imaging study               Plan:   · The patient verbalized understanding of exam findings and treatment plan. We engaged in the shared decision-making process and treatment options were discussed at length with the patient. Surgical and conservative management discussed today along with risks and benefits. · Patient has a history of a right ankle bimalleolar ORIF performed by Dr Kiara Johnson in 2018. She presents today due to posteromedial ankle pain. · Explained to the patient that her pain is most likely stemming from the one screw about her medial malleolus backing out and possibly into the posterior tibialis tendon. · We will order an MRI to further evaluate for a possible tear of the posterior tibial tendon cause by the screw. · Based on her symptoms and pain today she will benefit from removal of the medial malleolus screw but with the results of the MRI she could require a repair of the posterior tibialis tendon. Return for review of MRI results. History of Present Illness:   Chief Complaint:   Chief Complaint   Patient presents with   • Right Ankle - Pain     Jesus Hong is a 69 y.o. female who is being seen for right ankle pain. Patient denies any acute injury or trauma. Pain has been present for a few months. .  Pain is localized at posteromedial ankle with minimal radiating and described as sharp and severe. Patient denies numbness, tingling or radicular pain. Denies history of neuropathy. Patient does not smoke, does not have diabetes and does not take blood thinners.   Patient denies family history of anesthesia complications and has not had any complications with anesthesia. Pain/symptom timing:  Worse during the day when active  Pain/symptom context:  Worse with activites and work  Pain/symptom modifying factors:  Rest makes better, activities make worse  Pain/symptom associated signs/symptoms: none    Prior treatment   · NSAIDsYes    · Injections No   · Bracing/Orthotics Yes   · Physical Therapy Yes     Orthopedic Surgical History:   See below    Past Medical, Surgical and Social History:  Past Medical History:  has a past medical history of Colon polyp, COPD (chronic obstructive pulmonary disease) ([address]), Disease of thyroid gland, Hyperlipidemia, Hypertension, and Macular degeneration (08/04/2021). Problem List: does not have any pertinent problems on file. Past Surgical History:  has a past surgical history that includes Hysterectomy; ORIF tibia & fibula fractures (Right, 9/7/2018); Tonsillectomy; and Colonoscopy. Family History: family history is not on file. Social History:  reports that she has quit smoking. She has never used smokeless tobacco. She reports that she does not drink alcohol and does not use drugs. Current Medications: has a current medication list which includes the following prescription(s): cholecalciferol, denosumab, levothyroxine, olmesartan, simvastatin, incruse ellipta, and acetaminophen. Allergies: is allergic to sulfa antibiotics and azithromycin.      Review of Systems:  General- denies fever/chills  HEENT- denies hearing loss or sore throat  Eyes- denies eye pain or visual disturbances, denies red eyes  Respiratory- denies cough or SOB  Cardio- denies chest pain or palpitations  GI- denies abdominal pain  Endocrine- denies urinary frequency  Urinary- denies pain with urination  Musculoskeletal- Negative except noted above  Skin- denies rashes or wounds  Neurological- denies dizziness or headache  Psychiatric- denies anxiety or difficulty concentrating    Physical Exam:   /80 (BP Location: Left arm, Patient Position: Sitting, Cuff Size: Adult)   Pulse 82   Ht 5' 4" (1.626 m)   Wt 72.6 kg (160 lb)   BMI 27.46 kg/m²   General/Constitutional: No apparent distress: well-nourished and well developed. Eyes: normal ocular motion  Cardio: RRR, Normal S1S2, No m/r/g  Lymphatic: No appreciable lymphadenopathy  Respiratory: Non-labored breathing, CTA b/l no w/c/r  Vascular: No edema, swelling or tenderness, except as noted in detailed exam.  Integumentary: No impressive skin lesions present, except as noted in detailed exam.  Neuro: No ataxia or tremors noted  Psych: Normal mood and affect, oriented to person, place and time. Appropriate affect. Musculoskeletal: Normal, except as noted in detailed exam and in HPI. Examination    Right    Gait Antalgic with single point cane   Musculoskeletal Tender to palpation at posterior tib tendon    Skin Normal.      Nails Normal    Range of Motion  20 degrees dorsiflexion, 30 degrees plantarflexion  Subtalar motion: normal    Stability Stable    Muscle Strength 5/5 tibialis anterior  5/5 gastrocnemius-soleus  4/5 posterior tibialis  5/5 peroneal/eversion strength  5/5 EHL  5/5 FHL    Neurologic Normal    Sensation Intact to light touch throughout sural, saphenous, superficial peroneal, deep peroneal and medial/lateral plantar nerve distributions. [place]-Sara 5.07 filament (10g) testing deferred. Cardiovascular Brisk capillary refill < 2 seconds,intact DP and PT pulses    Special Tests None      Imaging Studies:   3 views of the right ankle were taken, reviewed and interpreted independently that demonstrate one of the orthopedic screws from previous ORIF of the medial malleolus is translated posteriorly. All other hardware appears in stable alignment and position. Reviewed by me personally. Hossein Zuleta.  Lachman, MD  Foot & Ankle Surgery   Department of [address] [place] [address]      I personally performed the service. Acosta Acosta.  Lachman, MD    Scribe Attestation    I,:  Rufino Najera am acting as a scribe while in the presence of the attending physician.:       I,:  Chepe Sorto MD personally performed the services described in this documentation    as scribed in my presence.:

## 2023-07-28 ENCOUNTER — HOSPITAL ENCOUNTER (OUTPATIENT)
Dept: RADIOLOGY | Facility: HOSPITAL | Age: 78
Discharge: HOME | End: 2023-07-28
Attending: ORTHOPAEDIC SURGERY
Payer: MEDICARE

## 2023-07-28 DIAGNOSIS — S86.111A RUPTURE OF RIGHT POSTERIOR TIBIALIS TENDON, INITIAL ENCOUNTER: ICD-10-CM

## 2023-07-28 PROCEDURE — G1004 CDSM NDSC: HCPCS

## 2023-07-28 PROCEDURE — 73721 MRI JNT OF LWR EXTRE W/O DYE: CPT

## 2023-08-09 ENCOUNTER — OFFICE VISIT (OUTPATIENT)
Dept: LAB | Facility: CLINIC | Age: 78
End: 2023-08-09
Payer: MEDICARE

## 2023-08-09 ENCOUNTER — OFFICE VISIT (OUTPATIENT)
Dept: OBGYN CLINIC | Facility: CLINIC | Age: 78
End: 2023-08-09
Payer: MEDICARE

## 2023-08-09 VITALS
BODY MASS INDEX: 27.31 KG/M2 | HEART RATE: 89 BPM | HEIGHT: 64 IN | WEIGHT: 160 LBS | SYSTOLIC BLOOD PRESSURE: 136 MMHG | DIASTOLIC BLOOD PRESSURE: 83 MMHG

## 2023-08-09 DIAGNOSIS — T84.84XA PAINFUL ORTHOPAEDIC HARDWARE (HCC): Primary | ICD-10-CM

## 2023-08-09 DIAGNOSIS — Z01.818 PREOP TESTING: ICD-10-CM

## 2023-08-09 DIAGNOSIS — M76.821 POSTERIOR TIBIAL TENDONITIS, RIGHT: ICD-10-CM

## 2023-08-09 LAB
ATRIAL RATE: 78 BPM
P AXIS: 74 DEGREES
PR INTERVAL: 146 MS
QRS AXIS: 24 DEGREES
QRSD INTERVAL: 72 MS
QT INTERVAL: 388 MS
QTC INTERVAL: 442 MS
T WAVE AXIS: 40 DEGREES
VENTRICULAR RATE: 78 BPM

## 2023-08-09 PROCEDURE — 93010 ELECTROCARDIOGRAM REPORT: CPT | Performed by: INTERNAL MEDICINE

## 2023-08-09 PROCEDURE — 99214 OFFICE O/P EST MOD 30 MIN: CPT | Performed by: ORTHOPAEDIC SURGERY

## 2023-08-09 PROCEDURE — 93005 ELECTROCARDIOGRAM TRACING: CPT

## 2023-08-09 RX ORDER — CHLORHEXIDINE GLUCONATE 4 G/100ML
SOLUTION TOPICAL DAILY PRN
OUTPATIENT
Start: 2023-08-09

## 2023-08-09 RX ORDER — CHLORHEXIDINE GLUCONATE 0.12 MG/ML
15 RINSE ORAL ONCE
OUTPATIENT
Start: 2023-08-09 | End: 2023-08-09

## 2023-08-09 NOTE — PROGRESS NOTES
Palak Dunne M.D. Attending, Orthopaedic Surgery  [facility]      ORTHOPAEDIC FOOT AND ANKLE CLINIC VISIT     Assessment:     Encounter Diagnoses   Name Primary? • Painful orthopaedic hardware [facility]) Yes   • Posterior tibial tendonitis, right    • Preop testing             Plan:   · The patient verbalized understanding of exam findings and treatment plan. We engaged in the shared decision-making process and treatment options were discussed at length with the patient. Surgical and conservative management discussed today along with risks and benefits. · She has a history of right ankle bimalleolar ORIF by Dr Peggy Mccartney in 2018, now with posteromedial ankle pain. · MRI demonstrates abutment of medial malleolar screw against posterior tibial tendon without complete tear. There is some concern for damage to the PTT. · She would benefit from removal of hardware from the medial malleolus. She may require FDL tendon transfer to navicular and MCO if the tendon is damaged significantly  · Informed consent obtained in clinic today. Return for 3 weeks post operatively for wound check. CONSENT FOR BONY PROCEDURES:   Patient understands that there is no guarantee that the surgery will relieve all of Her pain and also understands that there may be a prolonged course of protected weight-bearing status required which will restrict them from driving and other activities as discussed at today's visit. Patient recognizes that there are risks with surgery including bleeding, numbness, nerve irritation, wound complications, infection, continued pain, joint stiffness, malunion, nonunion, anesthetic complications, death, failure of procedure and possible need for further surgery. The patient understands that there is no guarantee that this surgery will relieve all of Her pain and symptoms.   Patient understands that there is no guarantee that they will return to full function after the procedure. Patient has provided informed consent for the procedure. History of Present Illness:   Chief Complaint:   Chief Complaint   Patient presents with   • Right Ankle - Follow-up     Hany Iqbal is a 69 y.o. female who is being seen in follow-up for Right ankle pain. She underwent right ankle bimalleolar ORIF with Dr Fred Albarado in 2018. XR demonstrates backing out of medial malleolus screw. When we last saw she we recommended she obtain an MRI to characterize quality of posterior tibial tendon. Pain has not improved. Residual pain is localized at the posteromedial ankle with minimal radiating and described as sharp and severe. Denies any new injury. Pain/symptom timing:  Worse during the day when active  Pain/symptom context:  Worse with activites and work  Pain/symptom modifying factors:  Rest makes better, activities make worse  Pain/symptom associated signs/symptoms: none    Prior treatment   · NSAIDsYes   · Injections No   · Bracing/Orthotics Yes    · Physical Therapy Yes     Orthopedic Surgical History:   See below    Past Medical, Surgical and Social History:  Past Medical History:  has a past medical history of Colon polyp, COPD (chronic obstructive pulmonary disease) ([address]), Disease of thyroid gland, Hyperlipidemia, Hypertension, and Macular degeneration (08/04/2021). Problem List: does not have any pertinent problems on file. Past Surgical History:  has a past surgical history that includes Hysterectomy; ORIF tibia & fibula fractures (Right, 9/7/2018); Tonsillectomy; and Colonoscopy. Family History: family history is not on file. Social History:  reports that she has quit smoking. She has never used smokeless tobacco. She reports that she does not drink alcohol and does not use drugs.   Current Medications: has a current medication list which includes the following prescription(s): acetaminophen, cholecalciferol, denosumab, levothyroxine, olmesartan, simvastatin, and incruse ellipta. Allergies: is allergic to sulfa antibiotics and azithromycin. Review of Systems:  General- denies fever/chills  HEENT- denies hearing loss or sore throat  Eyes- denies eye pain or visual disturbances, denies red eyes  Respiratory- denies cough or SOB  Cardio- denies chest pain or palpitations  GI- denies abdominal pain  Endocrine- denies urinary frequency  Urinary- denies pain with urination  Musculoskeletal- Negative except noted above  Skin- denies rashes or wounds  Neurological- denies dizziness or headache  Psychiatric- denies anxiety or difficulty concentrating    Physical Exam:   There were no vitals taken for this visit. General/Constitutional: No apparent distress: well-nourished and well developed. Eyes: normal ocular motion  Lymphatic: No appreciable lymphadenopathy  Respiratory: Non-labored breathing  Vascular: No edema, swelling or tenderness, except as noted in detailed exam.  Integumentary: No impressive skin lesions present, except as noted in detailed exam.  Neuro: No ataxia or tremors noted  Psych: Normal mood and affect, oriented to person, place and time. Appropriate affect. Musculoskeletal: Normal, except as noted in detailed exam and in HPI. Examination    Right    Gait Antalgic   Musculoskeletal Tender to palpation at posterior tibial tendon    Skin Normal.  Well-healed incisions. Nails Normal    Range of Motion  20 degrees dorsiflexion, 30 degrees plantarflexion  Subtalar motion: normal    Stability Stable    Muscle Strength 5/5 tibialis anterior  5/5 gastrocnemius-soleus  5/5 posterior tibialis  5/5 peroneal/eversion strength  5/5 EHL  5/5 FHL    Neurologic Normal    Sensation Intact to light touch throughout sural, saphenous, superficial peroneal, deep peroneal and medial/lateral plantar nerve distributions. [place]-Sara 5.07 filament (10g) testing deferred.     Cardiovascular Brisk capillary refill < 2 seconds,intact DP and PT pulses    Special Tests None Imaging Studies:   MRI available for review of Right ankle which demonstrates abutment of medial malleolus screw with continuity of posterior tibial tendon. Reviewed by me personally. Ana Kerns. Lachman, MD  Foot & Ankle Surgery   Department of [address] [place] [address] personally performed the service. Ana Kerns.  Lachman, MD

## 2023-08-09 NOTE — PATIENT INSTRUCTIONS
Drake Olsen M.D. Attending, [address] [place] [address] Office Phone: [phone] ? Fax: [phone] [address] [facility] Office Phone: [phone] ? QKY:[phone]    : Anna Mcclure, [address] [facility]     Surgery Coordinators Joe Cecile: Delon Escoto, [address], [phone]  Surgery Coordinator Romina:  Palak Otero, [phone]                                                               [email or web address]   PRE-OPERATIVE AND POST-OPERATIVE INSTRUCTIONS    General Information:  Your surgery is with Dr. Filemon Trevino. Dates can change (although rare) depending on emergencies. Typical post operative visits are at the following intervals:  3 weeks post surgery(except 1 week for bunions and wound monitoring), 6 weeks post surgery, 3 months post surgery, 6 months post surgery, and then on a yearly basis. However, this may change based on Dr. Garrick Simon recommendation. #1 post-operative rule for foot/ankle surgery:  ONCE YOU ARE OUT OF YOUR CAST AND/OR REMOVABLE BOOT, SWELLING MAY PERSIST FOR MANY MONTHS. YOU MIGHT ALSO EXPERIENCE A BLUISH DISCOLORATION OF YOUR LEG. THIS IS NORMAL AND PART OF THE USUAL POSTOPERATIVE EXPERIENCE. SMOKING:  Smoking results in incomplete healing of fractures (broken bones) and joints that my have been fused. Smoking and nicotine also prevents the growth of bone into ankle replacements and bone healing. It also slows the healing of muscles and skin (soft tissue). Therefore, please do not have surgery if you continue to smoke. We reserve the right to cancel your surgery if we suspect that you are smoking. DO NOT use nicorette gum or other patches. Please find an alternative method to quit smoking before your surgery. Pre-Operative Information:  Surgery date and preoperative visits:   If you have medical problems, such as an abnormal EKG, history of BLOOD CLOT, ANEURYSM, and any other heart condition, please inform us so that we can get your medical clearance several weeks before the surgery. Please bring any important medical information, such as an EKG, chest x-ray, or echocardiogram, with you to ensure that your surgery will not be delayed. If needed, you will receive your preoperative appointments in the mail or by phone from our scheduling office. The location of the preoperative appointment will be given to you also. You may not eat after midnight the night before surgery. If you do, your surgery will be cancelled. You will receive a phone call from your surgery center the day before your surgery (if your surgery is on a Monday, you will get a call the Friday before). If you do not hear from someone by 4pm the day before your surgery, please call the Surgical coordinator (number above) to notify us. Start taking Vitamin D3 4000 units per day and Calcium 1200mg per day immediately. You will continue this until your 3 month post-op visit. These are over the counter and available at all pharmacies and supermarkets. FOR THOSE HAVING SURGERY AT [facility] e WILL NEED CRUTCHES OR A ROLLING WALKER AFTER SURGERY, ASK FOR A PRESCRIPTION FOR THIS FROM OUR OFFICE TODAY. THIS CANNOT BE HANDLED THE DAY OF SURGERY AS [facility] DOES NOT STOCK THESE. Because bacterial can often enter any defect in the skin, it is important to avoid any cuts before surgery. Any breaks in the skin on the leg will often result in your surgery being postponed. Please avoid going on a very long walk the day prior to surgery, or doing other activities that could lead to irritation of the skin, including yard work, extra athletic activity, or shaving. This could result in surgery cancellation. You MUST be fasting the day of your surgery. Therefore, please do not consume any foot or beverage after midnight the night before surgery.   The morning of surgery you may take your usual medications with a sip of water. It is important not to take anti-inflammatory medication like Ibuprofen, Motrin, Naproxen (Aleve), or Aspirin 7-10 days before surgery because they will make you bleed more than usual.  Vitamin, E, Plavix and Coumadin also have the same effect. Stop Aspirin and Vitamin E two weeks before surgery. YOUR MEDICAL DOCTOR SHOULD TELL YOU WHEN TO STOP COUMADIN OR PLAVIX. If your surgery involves any bone healing, please do not take anti-inflammatories for at least 6 weeks after surgery. This can impede bone healing (ibuprofen, Aleve, Relafen, iodine). Tylenol is fine to take. PREOPERATIVE BATHING INSTRUCTIONS:    Before your surgery, bathe with Hibiclens (4% Chlorhexidene) as instructed below. This skin cleanser will help reduce the bacteria on your skin before surgery. To avoid irritating your eyes, do not apply Hibiclens above the level of your neck. On the evening before AND the morning of surgery, bathe your entire body except the face and scalp, then rinse freely. DO NOT apply to your face or scalp, as Hibiclens can irritate your eyes. Purchasing information:   Hibiclens is available without a prescription at [facility]. ADDITIONAL INSTRUCTIONS:  PATIENTS HAVING FOOT/ANKLE SURGERY     In preparation for your upcoming surgery, we kindly request and advise the following:  Notify our office if you are taking any of the following:  Coumadin (warfarin):  Persantine (dipyridamole); Pletal (cilostazol); Plavix (clopidogrel); Ticlid (ticlopidine); Agrylin (anagrelide); Aggrenox (dipyridamole and aspirin) or other blood thinners,. In addition, stop taking Vitamin E and herbal supplements. Do not schedule any elective dental work for at least 6 months after surgery. If you had an ankle replacement, you will need to take antibiotics before any future dental procedures. Your dentist or our office can prescribe these for you.   1000mg of Amoxicillin 1 hour prior to any dental procedure is the recommended dosing. THREE RULES:    After surgery you will most likely be given the instructions “KEEP YOUR TOES ABOVE YOUR NOSE.”  This means that you MUST have your feet elevated higher than your heart. Keeping your toes above your nose helps to heal the muscles and skin (soft tissues) by reducing swelling in your leg. This position also helps to prevent infection, and is very important in avoiding deep venous thrombosis (blood clots). In order to keep the blood circulating in your legs and in order to avoid deep vein   thrombosis (blood clots), we ask patients to GET UP ONCE AN HOUR during the day. This means you should at least cross the room and come back. It does not mean you have to be up for long periods of time. In most cases we will not have people immediately put any weight on their operated part. This is important to prevent loosening of metal or other devices holding the bones together. It also prevents irritation of the soft tissues which can lead to prolonged healing. When we say get up once an hour, please walk, hop or move with an assisted device. This is important! Do not do any excessive walking during the first few days after surgery. Recovering from surgery is a full-time task for the patient. Postoperative care is important to avoid irritating the skin incision, which can lead to infection. Please do not plan activities or go out of town for several weeks after surgery. If you are unsure about your future activities, please schedule surgery only when you know it is acceptable for you. Scheduling surgery and then canceling the date, prevents other people from having surgery on that date as it takes time to line everything up effectively. If you cancel your surgery the week of your planned surgery, we reserve the right to cancel all future surgical procedures.     THE DAY OF SURGERY:    Arrival to the hospital or outpatient surgical center on time is imperative. If you arrive late, then your surgery will be cancelled. You MUST have a family member/friend bring you, stay with you throughout the DURATION of your surgery, and drive you home. You MUST be fasting the day of your surgery. Therefore, do not consume any food or beverage after midnight the night before surgery. At your pre-operative visit with the anesthesia staff, or during your phone screen, a nurse will instruct you what medications you will need to take the day of surgery. MAKE SURE THAT THE PHARMACY LISTED IN THE ELECTRONIC MEDICAL RECORD (EPIC) IS YOUR PREFERRED PHARMACY. For example, if you are staying with family or a friend, and will not be near your preferred pharmacy, YOU MUST, tell the nurses checking you in the day of surgery so that this can be changed in the system. If your prescriptions are sent to a pharmacy, this cannot be changed. AFTER YOUR SURGERY:  Bleeding through the bandage almost always occurs. Do not let this alarm you. Simply add more gauze or a towel, call us, and come in for a dressing change. If you think it is excessive, contact us immediately or go to the local emergency room. Do not get the bandage wet. Showering is possible with plastic protectors. Be very careful, as the bathroom can be wet and slippery. If you do get your dressing wet, it should be changed immediately. Please contact us. ONCE YOUR ARE OUT OF YOUR CAST AND/OR REMOVABLE BOOT, SWELLING MAY PERSIST FOR MANY MONTHS. YOU MIGHT ALSO EXPERIENCE A BLUISH DISCOLORATION OF YOUR LEG. THIS IS NORMAL AND PART OF THE USUAL POSTOPERATIVE EXPERIENCE. WEARING COMPRESSION HOSE (ELASTIC STOCKINGS) CAN HELP AVOID SOME OF THIS SWELLING. DRESSING:   The purpose of the surgical dressing is to keep your wound and the surgical site protected from the environment.   Most dressings contain splints, which help to hold your foot and ankle in a corrected position, and also allow the surgical site to heal properly. Dressings will remain in place and undisturbed until the first postop visit. If you have a drain in place, this will need to be removed in 1-3 days after surgery. The time for the drain to be pulled will be written on your discharge instruction sheet. CAST  INSTRUCTIONS:  You may or may not get a cast following surgery. If you do, pay close attention to the following:    After application of a splint or cast, it is very important to elevate your leg for 24 to 72 hours. The injured area should be elevated well above the heart. Remember “Toes above your Nose”. Rest and elevation greatly reduce pain and speed the healing process by minimizing early swelling. CALL YOUR DOCTORS OFFICE OR VISIT LOCATION EMERGENCY ROOM IF YOU HAVE ANY OF THE FOLLOWING:    Significant increased pain, which may be caused by swelling, and the feeling that the splint or cast is too tight  Numbness and tingling in your hand or foot, which may be caused by too much pressure on the nerves  Burning and stinging, which may be caused by too much pressure on the skin  Excessive swelling below the cast, which may mean the cast is slowing your blood circulation  Loss of active movement of toes, which request an urgent evaluation  Loss of “capillary refill”. Pinch the tip of toes and bre the skin. Release pressure and if the skin does not return pink then call the office immediately. DO NOT GET YOUR CAST WET. Bacteria thrive in moist dark areas. We do not want this. If your cast becomes wet, return to the office and we will apply another one. PAIN AFTER SURGERY:  Narcotic pain medication can and will depress your respiratory system if taken in excess. The goal of pain management with narcotics is to be comfortable not pain free. If you take enough narcotics to be pain free then you run the risk of stopping breathing. If this happens, call 911 immediately!   Pain in the heel is often caused by pressure from the weight of your foot on the bed. Make sure your heel is suspended off the bed by keeping a pillow underneath your calf not your knee. Medications: You will be given narcotic pain medication. Do NOT drive while taking narcotic medications. Medications such as Darvocet, Percocet, Vicoden or Tylenol #3, also contain acetaminophen (Tylenol). Do not take acetaminophen or Tylenol from home when taking theses medications. When you fill your prescription, you may ask the pharmacist if your pain medication has acetaminophen/Tylenol in it. It is okay to take Tylenol with Oxycontin/Oxycodone. Should you have pain after taking your prescription medication, ibuprophen (Motrin, Advil, and Alleve) is a common over the counter preparation and may often be taken with the prescription pain medication as long as you take them with food. These medications can irritate the stomach lining. Unless you are allergic to aspirin or currently taking a blood thinner, Dr. Jeff Gomez patients are requested to take one 325 mg aspirin every 12 hours until you are back to walking normally after surgery (This can be up to 6 weeks). Narcotic medications commonly cause nausea. Taking them with food will decrease this side effect. If you are having extreme nausea, please contact us for an alternative medication or for something that can be taken with this medication to decrease the nausea. Also, narcotic medications frequently cause constipation. An increase of fiber, fruits and vegetables in your diet may alleviate this problem, or if necessary, you may use an over-the-counter medication such as senekot, colace, or Fibercon for constipation problems. You should resume all medications you were taking prior to the surgery unless otherwise specified. Activity:   Because of your recent foot surgery, your activity level will decrease.  You will need to elevate your foot ABOVE the level of your heart for a minimum of four days. The length of time necessary for the swelling to go down, and for your wounds to heal properly depends greatly on your efforts here. Elevation is extremely important to avoid compromising the blood supply to your foot. Remember when your foot is down it will swell, which will increase pain and slow healing. Wiggle your toes frequently if possible. If you go home with a regional block, (a type of anesthesia) the foot and leg will be numb. Think of ways to get into your house and around the house until the block wears off. Keep in mind that it may be a legal issue if you drive while in a cast or splint, especially when the splint is on the right foot. You may call the Department of [employer] to schedule a road test if you have adaptive equipment applied to your car. The amount of weight you are allowed to bear on your foot will be written on your discharge sheet filled out at the time of surgery. The following is an explanation of the possibilities:       [id number] has the following policies when it comes to ELECTIVE surgery  No elective surgery requiring anesthesia until 7 weeks after a patient tested positive for COVID-19   No elective surgery requiring anesthesia until 3 months after a patient was hospitalized for COVID-19      Weight bearing as tolerated (WBAT)   You may put your body weight on your foot as long as you tolerate the pain.

## 2023-08-09 NOTE — H&P (VIEW-ONLY)
Fermin Calvo M.D. Attending, Orthopaedic Surgery  [facility]      ORTHOPAEDIC FOOT AND ANKLE CLINIC VISIT     Assessment:     Encounter Diagnoses   Name Primary? • Painful orthopaedic hardware [facility]) Yes   • Posterior tibial tendonitis, right    • Preop testing             Plan:   · The patient verbalized understanding of exam findings and treatment plan. We engaged in the shared decision-making process and treatment options were discussed at length with the patient. Surgical and conservative management discussed today along with risks and benefits. · She has a history of right ankle bimalleolar ORIF by Dr Kita Mills in 2018, now with posteromedial ankle pain. · MRI demonstrates abutment of medial malleolar screw against posterior tibial tendon without complete tear. There is some concern for damage to the PTT. · She would benefit from removal of hardware from the medial malleolus. She may require FDL tendon transfer to navicular and MCO if the tendon is damaged significantly  · Informed consent obtained in clinic today. Return for 3 weeks post operatively for wound check. CONSENT FOR BONY PROCEDURES:   Patient understands that there is no guarantee that the surgery will relieve all of Her pain and also understands that there may be a prolonged course of protected weight-bearing status required which will restrict them from driving and other activities as discussed at today's visit. Patient recognizes that there are risks with surgery including bleeding, numbness, nerve irritation, wound complications, infection, continued pain, joint stiffness, malunion, nonunion, anesthetic complications, death, failure of procedure and possible need for further surgery. The patient understands that there is no guarantee that this surgery will relieve all of Her pain and symptoms.   Patient understands that there is no guarantee that they will return to full function after the procedure. Patient has provided informed consent for the procedure. History of Present Illness:   Chief Complaint:   Chief Complaint   Patient presents with   • Right Ankle - Follow-up     Mahi Moya is a 69 y.o. female who is being seen in follow-up for Right ankle pain. She underwent right ankle bimalleolar ORIF with Dr Jonathon Orta in 2018. XR demonstrates backing out of medial malleolus screw. When we last saw she we recommended she obtain an MRI to characterize quality of posterior tibial tendon. Pain has not improved. Residual pain is localized at the posteromedial ankle with minimal radiating and described as sharp and severe. Denies any new injury. Pain/symptom timing:  Worse during the day when active  Pain/symptom context:  Worse with activites and work  Pain/symptom modifying factors:  Rest makes better, activities make worse  Pain/symptom associated signs/symptoms: none    Prior treatment   · NSAIDsYes   · Injections No   · Bracing/Orthotics Yes    · Physical Therapy Yes     Orthopedic Surgical History:   See below    Past Medical, Surgical and Social History:  Past Medical History:  has a past medical history of Colon polyp, COPD (chronic obstructive pulmonary disease) ([address]), Disease of thyroid gland, Hyperlipidemia, Hypertension, and Macular degeneration (08/04/2021). Problem List: does not have any pertinent problems on file. Past Surgical History:  has a past surgical history that includes Hysterectomy; ORIF tibia & fibula fractures (Right, 9/7/2018); Tonsillectomy; and Colonoscopy. Family History: family history is not on file. Social History:  reports that she has quit smoking. She has never used smokeless tobacco. She reports that she does not drink alcohol and does not use drugs.   Current Medications: has a current medication list which includes the following prescription(s): acetaminophen, cholecalciferol, denosumab, levothyroxine, olmesartan, simvastatin, and incruse ellipta. Allergies: is allergic to sulfa antibiotics and azithromycin. Review of Systems:  General- denies fever/chills  HEENT- denies hearing loss or sore throat  Eyes- denies eye pain or visual disturbances, denies red eyes  Respiratory- denies cough or SOB  Cardio- denies chest pain or palpitations  GI- denies abdominal pain  Endocrine- denies urinary frequency  Urinary- denies pain with urination  Musculoskeletal- Negative except noted above  Skin- denies rashes or wounds  Neurological- denies dizziness or headache  Psychiatric- denies anxiety or difficulty concentrating    Physical Exam:   There were no vitals taken for this visit. General/Constitutional: No apparent distress: well-nourished and well developed. Eyes: normal ocular motion  Lymphatic: No appreciable lymphadenopathy  Respiratory: Non-labored breathing  Vascular: No edema, swelling or tenderness, except as noted in detailed exam.  Integumentary: No impressive skin lesions present, except as noted in detailed exam.  Neuro: No ataxia or tremors noted  Psych: Normal mood and affect, oriented to person, place and time. Appropriate affect. Musculoskeletal: Normal, except as noted in detailed exam and in HPI. Examination    Right    Gait Antalgic   Musculoskeletal Tender to palpation at posterior tibial tendon    Skin Normal.  Well-healed incisions. Nails Normal    Range of Motion  20 degrees dorsiflexion, 30 degrees plantarflexion  Subtalar motion: normal    Stability Stable    Muscle Strength 5/5 tibialis anterior  5/5 gastrocnemius-soleus  5/5 posterior tibialis  5/5 peroneal/eversion strength  5/5 EHL  5/5 FHL    Neurologic Normal    Sensation Intact to light touch throughout sural, saphenous, superficial peroneal, deep peroneal and medial/lateral plantar nerve distributions. [place] 5.07 filament (10g) testing deferred.     Cardiovascular Brisk capillary refill < 2 seconds,intact DP and PT pulses    Special Tests None Imaging Studies:   MRI available for review of Right ankle which demonstrates abutment of medial malleolus screw with continuity of posterior tibial tendon. Reviewed by me personally. Carolee Christian. Lachman, MD  Foot & Ankle Surgery   Department of [address] [place] [address]      I personally performed the service. Carolee Christian. Lachman, MD

## 2023-08-18 ENCOUNTER — ANESTHESIA EVENT (OUTPATIENT)
Dept: PERIOP | Facility: AMBULARY SURGERY CENTER | Age: 78
End: 2023-08-18
Payer: MEDICARE

## 2023-08-24 ENCOUNTER — ANESTHESIA (OUTPATIENT)
Dept: PERIOP | Facility: AMBULARY SURGERY CENTER | Age: 78
End: 2023-08-24
Payer: MEDICARE

## 2023-08-24 ENCOUNTER — APPOINTMENT (OUTPATIENT)
Dept: RADIOLOGY | Facility: AMBULARY SURGERY CENTER | Age: 78
End: 2023-08-24
Payer: MEDICARE

## 2023-08-24 ENCOUNTER — HOSPITAL ENCOUNTER (OUTPATIENT)
Facility: AMBULARY SURGERY CENTER | Age: 78
Setting detail: OUTPATIENT SURGERY
Discharge: HOME | End: 2023-08-24
Attending: ORTHOPAEDIC SURGERY | Admitting: ORTHOPAEDIC SURGERY
Payer: MEDICARE

## 2023-08-24 VITALS
HEIGHT: 64 IN | OXYGEN SATURATION: 97 % | DIASTOLIC BLOOD PRESSURE: 57 MMHG | HEART RATE: 90 BPM | SYSTOLIC BLOOD PRESSURE: 122 MMHG | TEMPERATURE: 97.8 F | RESPIRATION RATE: 18 BRPM | WEIGHT: 158 LBS | BODY MASS INDEX: 26.98 KG/M2

## 2023-08-24 DIAGNOSIS — Z98.890 S/P ORIF (OPEN REDUCTION INTERNAL FIXATION) FRACTURE: ICD-10-CM

## 2023-08-24 DIAGNOSIS — M76.821 POSTERIOR TIBIAL TENDONITIS, RIGHT: ICD-10-CM

## 2023-08-24 DIAGNOSIS — T84.84XA PAINFUL ORTHOPAEDIC HARDWARE (HCC): Primary | ICD-10-CM

## 2023-08-24 DIAGNOSIS — Z87.81 S/P ORIF (OPEN REDUCTION INTERNAL FIXATION) FRACTURE: ICD-10-CM

## 2023-08-24 PROCEDURE — 27659 REPAIR OF LEG TENDON EACH: CPT | Performed by: ORTHOPAEDIC SURGERY

## 2023-08-24 PROCEDURE — 20680 REMOVAL OF IMPLANT DEEP: CPT | Performed by: ORTHOPAEDIC SURGERY

## 2023-08-24 PROCEDURE — NC001 PR NO CHARGE

## 2023-08-24 RX ORDER — VANCOMYCIN HYDROCHLORIDE 1 G/20ML
INJECTION, POWDER, LYOPHILIZED, FOR SOLUTION INTRAVENOUS AS NEEDED
Status: DISCONTINUED | OUTPATIENT
Start: 2023-08-24 | End: 2023-08-24 | Stop reason: HOSPADM

## 2023-08-24 RX ORDER — ALBUTEROL SULFATE 2.5 MG/3ML
2.5 SOLUTION RESPIRATORY (INHALATION) ONCE AS NEEDED
Status: DISCONTINUED | OUTPATIENT
Start: 2023-08-24 | End: 2023-08-24 | Stop reason: HOSPADM

## 2023-08-24 RX ORDER — CHLORHEXIDINE GLUCONATE 4 G/100ML
SOLUTION TOPICAL DAILY PRN
Status: DISCONTINUED | OUTPATIENT
Start: 2023-08-24 | End: 2023-08-24 | Stop reason: HOSPADM

## 2023-08-24 RX ORDER — OXYCODONE HYDROCHLORIDE 5 MG/1
5 TABLET ORAL EVERY 4 HOURS PRN
Qty: 30 TABLET | Refills: 0 | Status: SHIPPED | OUTPATIENT
Start: 2023-08-24

## 2023-08-24 RX ORDER — MEPERIDINE HYDROCHLORIDE 25 MG/ML
12.5 INJECTION INTRAMUSCULAR; INTRAVENOUS; SUBCUTANEOUS ONCE
Status: DISCONTINUED | OUTPATIENT
Start: 2023-08-24 | End: 2023-08-24 | Stop reason: HOSPADM

## 2023-08-24 RX ORDER — HYDROMORPHONE HCL/PF 1 MG/ML
0.5 SYRINGE (ML) INJECTION
Status: DISCONTINUED | OUTPATIENT
Start: 2023-08-24 | End: 2023-08-24 | Stop reason: HOSPADM

## 2023-08-24 RX ORDER — ONDANSETRON 2 MG/ML
4 INJECTION INTRAMUSCULAR; INTRAVENOUS ONCE AS NEEDED
Status: DISCONTINUED | OUTPATIENT
Start: 2023-08-24 | End: 2023-08-24 | Stop reason: HOSPADM

## 2023-08-24 RX ORDER — CHLORHEXIDINE GLUCONATE 0.12 MG/ML
15 RINSE ORAL ONCE
Status: DISCONTINUED | OUTPATIENT
Start: 2023-08-24 | End: 2023-08-24 | Stop reason: HOSPADM

## 2023-08-24 RX ORDER — CEFAZOLIN SODIUM 2 G/50ML
2000 SOLUTION INTRAVENOUS ONCE
Status: COMPLETED | OUTPATIENT
Start: 2023-08-24 | End: 2023-08-24

## 2023-08-24 RX ORDER — LABETALOL HYDROCHLORIDE 5 MG/ML
5 INJECTION, SOLUTION INTRAVENOUS
Status: DISCONTINUED | OUTPATIENT
Start: 2023-08-24 | End: 2023-08-24 | Stop reason: HOSPADM

## 2023-08-24 RX ORDER — SODIUM CHLORIDE, SODIUM LACTATE, POTASSIUM CHLORIDE, CALCIUM CHLORIDE 600; 310; 30; 20 MG/100ML; MG/100ML; MG/100ML; MG/100ML
INJECTION, SOLUTION INTRAVENOUS CONTINUOUS PRN
Status: DISCONTINUED | OUTPATIENT
Start: 2023-08-24 | End: 2023-08-24

## 2023-08-24 RX ORDER — ONDANSETRON 4 MG/1
4 TABLET, FILM COATED ORAL EVERY 8 HOURS PRN
Qty: 10 TABLET | Refills: 0 | Status: SHIPPED | OUTPATIENT
Start: 2023-08-24

## 2023-08-24 RX ORDER — MAGNESIUM HYDROXIDE 1200 MG/15ML
LIQUID ORAL AS NEEDED
Status: DISCONTINUED | OUTPATIENT
Start: 2023-08-24 | End: 2023-08-24 | Stop reason: HOSPADM

## 2023-08-24 RX ORDER — SODIUM CHLORIDE, SODIUM LACTATE, POTASSIUM CHLORIDE, CALCIUM CHLORIDE 600; 310; 30; 20 MG/100ML; MG/100ML; MG/100ML; MG/100ML
125 INJECTION, SOLUTION INTRAVENOUS CONTINUOUS
Status: DISCONTINUED | OUTPATIENT
Start: 2023-08-24 | End: 2023-08-24 | Stop reason: HOSPADM

## 2023-08-24 RX ORDER — FENTANYL CITRATE/PF 50 MCG/ML
25 SYRINGE (ML) INJECTION
Status: DISCONTINUED | OUTPATIENT
Start: 2023-08-24 | End: 2023-08-24 | Stop reason: HOSPADM

## 2023-08-24 RX ORDER — ONDANSETRON 2 MG/ML
INJECTION INTRAMUSCULAR; INTRAVENOUS AS NEEDED
Status: DISCONTINUED | OUTPATIENT
Start: 2023-08-24 | End: 2023-08-24

## 2023-08-24 RX ORDER — DEXAMETHASONE SODIUM PHOSPHATE 10 MG/ML
INJECTION, SOLUTION INTRAMUSCULAR; INTRAVENOUS AS NEEDED
Status: DISCONTINUED | OUTPATIENT
Start: 2023-08-24 | End: 2023-08-24

## 2023-08-24 RX ORDER — ASPIRIN 325 MG
325 TABLET, DELAYED RELEASE (ENTERIC COATED) ORAL 2 TIMES DAILY
Qty: 84 TABLET | Refills: 0 | Status: SHIPPED | OUTPATIENT
Start: 2023-08-24

## 2023-08-24 RX ORDER — PROPOFOL 10 MG/ML
INJECTION, EMULSION INTRAVENOUS AS NEEDED
Status: DISCONTINUED | OUTPATIENT
Start: 2023-08-24 | End: 2023-08-24

## 2023-08-24 RX ORDER — LIDOCAINE HYDROCHLORIDE 20 MG/ML
INJECTION, SOLUTION EPIDURAL; INFILTRATION; INTRACAUDAL; PERINEURAL AS NEEDED
Status: DISCONTINUED | OUTPATIENT
Start: 2023-08-24 | End: 2023-08-24

## 2023-08-24 RX ORDER — OXYCODONE HYDROCHLORIDE 5 MG/1
5 TABLET ORAL ONCE AS NEEDED
Status: COMPLETED | OUTPATIENT
Start: 2023-08-24 | End: 2023-08-24

## 2023-08-24 RX ORDER — EPHEDRINE SULFATE 50 MG/ML
INJECTION INTRAVENOUS AS NEEDED
Status: DISCONTINUED | OUTPATIENT
Start: 2023-08-24 | End: 2023-08-24

## 2023-08-24 RX ORDER — PROMETHAZINE HYDROCHLORIDE 25 MG/ML
12.5 INJECTION, SOLUTION INTRAMUSCULAR; INTRAVENOUS ONCE AS NEEDED
Status: DISCONTINUED | OUTPATIENT
Start: 2023-08-24 | End: 2023-08-24 | Stop reason: HOSPADM

## 2023-08-24 RX ORDER — FENTANYL CITRATE 50 UG/ML
INJECTION, SOLUTION INTRAMUSCULAR; INTRAVENOUS AS NEEDED
Status: DISCONTINUED | OUTPATIENT
Start: 2023-08-24 | End: 2023-08-24

## 2023-08-24 RX ADMIN — SODIUM CHLORIDE, SODIUM LACTATE, POTASSIUM CHLORIDE, AND CALCIUM CHLORIDE: .6; .31; .03; .02 INJECTION, SOLUTION INTRAVENOUS at 11:21

## 2023-08-24 RX ADMIN — EPHEDRINE SULFATE 5 MG: 50 INJECTION INTRAVENOUS at 12:03

## 2023-08-24 RX ADMIN — FENTANYL CITRATE 25 MCG: 50 INJECTION INTRAMUSCULAR; INTRAVENOUS at 11:58

## 2023-08-24 RX ADMIN — EPHEDRINE SULFATE 5 MG: 50 INJECTION INTRAVENOUS at 11:59

## 2023-08-24 RX ADMIN — FENTANYL CITRATE 25 MCG: 50 INJECTION INTRAMUSCULAR; INTRAVENOUS at 12:42

## 2023-08-24 RX ADMIN — DEXMEDETOMIDINE HYDROCHLORIDE 12 MCG: 100 INJECTION, SOLUTION INTRAVENOUS at 11:50

## 2023-08-24 RX ADMIN — LIDOCAINE HYDROCHLORIDE 50 MG: 20 INJECTION, SOLUTION EPIDURAL; INFILTRATION; INTRACAUDAL at 11:41

## 2023-08-24 RX ADMIN — EPHEDRINE SULFATE 10 MG: 50 INJECTION INTRAVENOUS at 12:11

## 2023-08-24 RX ADMIN — ONDANSETRON 4 MG: 2 INJECTION INTRAMUSCULAR; INTRAVENOUS at 11:41

## 2023-08-24 RX ADMIN — FENTANYL CITRATE 25 MCG: 50 INJECTION INTRAMUSCULAR; INTRAVENOUS at 11:40

## 2023-08-24 RX ADMIN — DEXAMETHASONE SODIUM PHOSPHATE 8 MG: 10 INJECTION, SOLUTION INTRAMUSCULAR; INTRAVENOUS at 11:43

## 2023-08-24 RX ADMIN — FENTANYL CITRATE 25 MCG: 50 INJECTION INTRAMUSCULAR; INTRAVENOUS at 12:32

## 2023-08-24 RX ADMIN — PROPOFOL 150 MG: 10 INJECTION, EMULSION INTRAVENOUS at 11:41

## 2023-08-24 RX ADMIN — CEFAZOLIN SODIUM 2000 MG: 2 SOLUTION INTRAVENOUS at 11:38

## 2023-08-24 RX ADMIN — FENTANYL CITRATE 25 MCG: 50 INJECTION INTRAMUSCULAR; INTRAVENOUS at 11:53

## 2023-08-24 RX ADMIN — FENTANYL CITRATE 25 MCG: 50 INJECTION INTRAMUSCULAR; INTRAVENOUS at 11:38

## 2023-08-24 RX ADMIN — OXYCODONE HYDROCHLORIDE 5 MG: 5 TABLET ORAL at 13:10

## 2023-08-24 RX ADMIN — EPHEDRINE SULFATE 10 MG: 50 INJECTION INTRAVENOUS at 12:15

## 2023-08-24 NOTE — ANESTHESIA PREPROCEDURE EVALUATION
Procedure:  REMOVAL HARDWARE ANKLE, Possible FLD tendon transfer to navicular, possible medial slide calcaneal osteotomy (Right: Ankle)    Relevant Problems   CARDIO   (+) Other hyperlipidemia      ENDO   (+) Other specified hypothyroidism      PULMONARY   (+) COPD (chronic obstructive pulmonary disease) (HCC)        Physical Exam    Airway    Mallampati score: I  TM Distance: >3 FB  Neck ROM: full     Dental   No notable dental hx     Cardiovascular  Rhythm: regular, Rate: normal, Cardiovascular exam normal    Pulmonary  Pulmonary exam normal Breath sounds clear to auscultation,     Other Findings        Anesthesia Plan  ASA Score- 2     Anesthesia Type- general with ASA Monitors. Additional Monitors:   Airway Plan: LMA. Comment: Patient seen and examined, history reviewed. Patient to be done under general anesthesia with LMA and routine monitors. Risks discussed with the patient, consent obtained. .       Plan Factors-Exercise tolerance (METS): >4 METS. Chart reviewed. EKG reviewed. Existing labs reviewed. Patient summary reviewed. Induction- intravenous. Postoperative Plan- Plan for postoperative opioid use. Informed Consent- Anesthetic plan and risks discussed with patient. I personally reviewed this patient with the CRNA. Discussed and agreed on the Anesthesia Plan with the CRNA. Sony Arredondo

## 2023-08-24 NOTE — DISCHARGE INSTR - AVS FIRST PAGE
Levie Bumpers, M.D. Attending, [address] [place] [address] Office Phone: [phone] ? Fax: [phone]  [place] Office Phone: [phone] ? [phone]    : Warden Fontaine, [address] [facility]     Surgery Coordinators Andrea Swann: Carlos Patelrodriguez, [phone]  Safia Modi, [phone]  Surgery Coordinator Romina:  Misty Hi, [phone]                                                              [email or web address]   POST-OPERATIVE INSTRUCTIONS    General Information:  Typical post operative visits are at the following intervals:  2-3 weeks post surgery, 6 weeks post surgery, 3 months post surgery, 6 months post surgery, and then on a yearly basis. However, this may change based on Dr. Latasha Rodriguez recommendation. #1 post-operative rule for foot/ankle surgery:  ONCE YOU ARE OUT OF YOUR CAST AND/OR REMOVABLE BOOT, SWELLING MAY PERSIST FOR MANY MONTHS. YOU MIGHT ALSO EXPERIENCE A BLUISH DISCOLORATION OF YOUR LEG. THIS IS NORMAL AND PART OF THE USUAL POSTOPERATIVE EXPERIENCE. DO NOT WAIT UNTIL YOUR BLOCK WEARS OFF TO TAKE YOUR PAIN MEDICATION. IT TAKES A FEW DOSES OF THE PAIN MEDICATION TO REACH A THERAPEUTIC LEVEL. TAKE A TABLET PROACTIVELY BEFORE YOU HAVE ANY PAIN AND AGAIN 4 HOURS LATER SO WHEN THE BLOCK WEARS OFF, YOU ARE NOT CAUGHT OFF GUARD. SMOKING:  Smoking results in incomplete healing of fractures (broken bones) and joints that my have been fused. Smoking and nicotine also prevents the growth of bone into ankle replacements and bone healing. It also slows the healing of muscles and skin (soft tissue). Therefore, please do not have surgery if you continue to smoke. We reserve the right to cancel your surgery if we suspect that you are smoking. DO NOT use nicorette gum or other patches.   Please find an alternative method to quit smoking before your surgery and do not restart after surgery to allow for healing. THREE RULES:    After surgery you will most likely be given the instructions “KEEP YOUR TOES ABOVE YOUR NOSE.”  This means that you MUST have your feet elevated higher than your heart. Keeping your toes above your nose helps to heal the muscles and skin (soft tissues) by reducing swelling in your leg. This position also helps to prevent infection, and is very important in avoiding deep venous thrombosis (blood clots). In order to keep the blood circulating in your legs and in order to avoid deep vein   thrombosis (blood clots), we ask patients to GET UP ONCE AN HOUR during the day. This means you should at least cross the room and come back. It does not mean you have to be up for long periods of time. In most cases we will not have people immediately put any weight on their operated part. This is important to prevent loosening of metal or other devices holding the bones together. It also prevents irritation of the soft tissues which can lead to prolonged healing. When we say get up once an hour, please walk, hop or move with an assisted device. This is important! Do not do any excessive walking during the first few days after surgery. Recovering from surgery is a full-time task for the patient. Postoperative care is important to avoid irritating the skin incision, which can lead to infection. Please do not plan activities or go out of town for several weeks after surgery. AFTER YOUR SURGERY:  Bleeding through the bandage almost always occurs. Do not let this alarm you. Simply overwrap with an ABD pad and Ace bandage (The nurses discharging your from the day of surgery will provide this.)   If you think it is excessive, you can come in early for a dressing change (at around 1 week instead of 3 weeks postop.)    Do not get the bandage wet. Showering is possible with plastic protectors. Be very careful, as the bathroom can be wet and slippery.   If you do get your dressing wet, it should be changed immediately. Please contact us. ONCE YOUR ARE OUT OF YOUR CAST AND/OR REMOVABLE BOOT, SWELLING MAY PERSIST FOR MANY MONTHS. THERE WILL ALSO BE A BLUISH DISCOLORATION OF YOUR LEG FOR MONTHS. THIS IS NORMAL AND PART OF THE USUAL POSTOPERATIVE EXPERIENCE. WEARING COMPRESSION HOSE (ELASTIC STOCKINGS) CAN HELP AVOID SOME OF THIS SWELLING. Ice the area 20 minutes every hour once the nerve block wears off. If you are in a cast or a splint, you may need to leave the ice on longer than 20 minutes in order to feel any benefits. DRESSING:   The purpose of the surgical dressing is to keep your wound and the surgical site protected from the environment. Most dressings contain splints, which help to hold your foot and ankle in a corrected position, and also allow the surgical site to heal properly. If you have a drain in place, this will need to be removed in 1 day after surgery. The time for the drain to be pulled will be written on your discharge instruction sheet. CAST  INSTRUCTIONS:  You may or may not get a cast following surgery. If you do, pay close attention to the following:    After application of a splint or cast, it is very important to elevate your leg for 24 to 72 hours. The injured area should be elevated well above the heart. Remember “Toes above your Nose”. Rest and elevation greatly reduce pain and speed the healing process by minimizing early swelling.     CALL YOUR DOCTORS OFFICE OR VISIT LOCATION EMERGENCY ROOM IF YOU HAVE ANY OF THE FOLLOWING:    Significant increased pain, which may be caused by swelling (Strict elevation will alleviate this)  Numbness and tingling in your hand or foot, which may be caused by too much pressure on the nerves (There is always some numbness after surgery due to nerve blocks)  Burning and stinging, which may be caused by too much pressure on the skin  Excessive swelling below the cast, which may mean the cast is slowing your blood circulation  Loss of active movement of toes, which request an urgent evaluation  Loss of “capillary refill”. Pinch the tip of toes and bre the skin. Release pressure and if the skin does not return pink then call the office immediately. DO NOT GET YOUR CAST WET. Bacteria thrive in moist dark areas. We do not want this. If your cast becomes wet, return to the office and we will apply another one. PAIN AFTER SURGERY:  Narcotic pain medication can and will depress your respiratory system if taken in excess. The goal of pain management with narcotics is to be comfortable not pain free. If you take enough narcotics to be pain free then you run the risk of stopping breathing. If this happens, call 911 immediately! Pain in the heel is often  caused by pressure from the weight of your foot on the bed. Make sure your heel is suspended off the bed by keeping a pillow underneath your calf not your knee. Medications: You will be given narcotic pain medication. Do NOT drive while taking narcotic medications. Medications such as Darvocet, Percocet, Vicoden or Tylenol #3, also contain acetaminophen (Tylenol). Do not take acetaminophen or Tylenol from home when taking theses medications. When you fill your prescription, you may ask the pharmacist if your pain medication has acetaminophen/Tylenol in it. It is okay to take Tylenol with Oxycontin/Oxycodone. Unless you are allergic to aspirin or currently taking a blood thinner, Dr. Fidel Galvan patients are requested to take one 325 mg aspirin every 12 hours until you are back to walking normally after surgery (This can be up to 6 weeks). Ecotrin (Enteric-coated aspirin) is more sensitive to the stomach and we recommend purchasing this instead of regular aspirin to minimize the risk of stomach irritation. Narcotic medications commonly cause nausea. Taking them with food will decrease this side effect.  If you are having extreme nausea, please contact us for an alternative medication or for something that can be taken with this medication to decrease the nausea. Also, narcotic medications frequently cause constipation. An increase of fiber, fruits and vegetables in your diet may alleviate this problem, or if necessary, you may use an over-the-counter medication such as senekot, colace, or Fibercon for constipation problems. You should resume all medications you were taking prior to the surgery unless otherwise specified. If you had fracture surgery, bony surgery like an osteotomy or fusion, or a surgery that requires bone healing, you are advised to take Vitamin D and Calcium to improve healing potential.  Vitamin D3 4000 units/day and Calcium 1200mg/day. These are over the counter medications so please pick them up at the pharmacy when you are picking up your prescriptions. Activity:   Because of your recent foot surgery, your activity level will decrease. You will need to elevate your foot ABOVE the level of your heart for a minimum of four days. The length of time necessary for the swelling to go down, and for your wounds to heal properly depends greatly on your efforts here. Elevation is extremely important to avoid compromising the blood supply to your foot. Remember when your foot is down it will swell, which will increase pain and slow healing. Wiggle your toes frequently if possible. If you go home with a regional block, (a type of anesthesia) the foot and leg will be numb. Think of ways to get into your house and around the house until the block wears off. Keep in mind that it may be a legal issue if you drive while in a cast or splint, especially when the splint is on the right foot. You may call the Department of Motor Vehicles to schedule a road test if you have adaptive equipment applied to your car.    The amount of weight you are allowed to bear on your foot will be written on your discharge sheet filled out at the time of surgery. The following is an explanation of the possibilities:     Weight bearing as tolerated (WBAT)   You may put your body weight on your foot as long as you tolerate the pain.

## 2023-08-24 NOTE — INTERVAL H&P NOTE
H&P reviewed. After examining the patient I find no changes in the patients condition since the H&P had been written. Vitals:    08/24/23 1000   BP: 142/65   Pulse: 86   Resp: 16   Temp: (!) 97.4 °F (36.3 °C)   SpO2: 97%       Plan for right ankle removal of hardware, posterior tibial tendon exploration, possible FDL tendon transfer to the navicular, possible medial slide calcaneal osteotomy.

## 2023-08-24 NOTE — OP NOTE
OPERATIVE REPORT  PATIENT NAME: Alessandro Rollins    :  1945  MRN: [id number]  Pt Location: [facility] OR [facility]OM 06    SURGERY DATE: 2023    Surgeon(s) and Role:     * Summer Benavides MD - Primary     * Teresa Wade MD - Assisting     * Alma Roque PA-C - Assisting    Preop Diagnosis:  Painful orthopaedic hardware [facility]) [T84.84XA]  Posterior tibial tendonitis, right [D96.077]    Post-Op Diagnosis Codes:     * Painful orthopaedic hardware [facility]) [T84.84XA]     * Posterior tibial tendonitis, right [M76.821]    Procedure(s):  Right - REMOVAL HARDWARE ANKLE. Possible FLD tendon transfer to navicular. possible medial slide calcaneal osteotomy    Specimen(s):  * No specimens in log *    Estimated Blood Loss:   Minimal    Drains:  * No LDAs found *    Anesthesia Type:   Choice    Operative Indications:  Painful orthopaedic hardware [facility]) [T84.84XA]  Posterior tibial tendonitis, right [W49.267]      Operative Findings:  Consistent with diagnosis    Complications:   None    Procedure and Technique:  1. Evaluation of posterior tibial tendon and subtotal tenosynovectomy and repair of split tear posterior tibial tendon  2. Removal painful orthopaedic hardware medial malleolus (separate incision)  3. Fluoroscopy without benefit of radiologist, <1 hour        OPERATIVE REPORT:    After informed consent and preoperative medical clearance were obtained, the patient was taken to the preoperative holding area. Allergies were properly assessed and patient was given appropriate perioperative IV antibiotics without complication. Please see the anesthesia report for details of the anesthesia administered. Patient was taken the operating room placed supine on the operating room table. All bony prominences and skin were well padded, airway maintained, genitalia protected and brachial plexi/ulnar nerves at the elbows protected. Patient's operative lower extremity was prepped and draped in sterile fashion. The operative lower extremity was exsanguinated with esmarch elastic bandage and a thigh tourniquet was utilized. A time-out was performed with the attending surgeon in the room. We then made a Medial longitudinal incision through the previous scar, Saphenous nerve protected, we identified the medial malleolus screws. We used the appropriate guidewire and screw  to removed both screws. We confirmed the removal of these screws and plate on Xray. Next we opened the posterior tibial tendon sheath and evaluated the tendon at the level of the medial malleolus screw that was abutting it. This was at the musculotendinous junction. There was clear irrigation and synovitis at this level and a split tear in the tendon. We debrided the edges of the split tear and then repaired the tear in a running fashion with a 2-0 ethibond suture. We excised the tenosynovitis and cleaned up the tendon of any degenerative scar surrounding it. The wound was irrigated thoroughly, Vancomycin powder was used in the wound bed. The wound was closed in layers with Vicryl suture for the deeper layers and nylon suture for the skin for a tension-less closure. There was no blanching at the skin margins after closure. Sterile dressings were applied with the ankle in neutral position and over-abundant padding with a curlex soft splint was applied. Satisfactory capillary refill remained in all toes. Patient tolerated the procedure well. There were no complications. She was taken to the recovery room in stable condition. I was present for the entire procedure.     Patient Disposition:  PACU         SIGNATURE: Fermin Calvo MD  DATE: August 24, 2023  TIME: 11:28 AM

## 2023-08-24 NOTE — ANESTHESIA POSTPROCEDURE EVALUATION
Post-Op Assessment Note    CV Status:  Stable  Pain Score: 0    Pain management: adequate     Mental Status:  Arousable   Hydration Status:  Euvolemic and stable   PONV Controlled:  Controlled   Airway Patency:  Patent      Post Op Vitals Reviewed: Yes      Staff: CRNA         No notable events documented.     BP   97/50   Temp 98.4   Pulse 98   Resp 14   SPo2 100

## 2023-09-12 ENCOUNTER — OFFICE VISIT (OUTPATIENT)
Dept: OBGYN CLINIC | Facility: CLINIC | Age: 78
End: 2023-09-12

## 2023-09-12 VITALS
BODY MASS INDEX: 26.98 KG/M2 | DIASTOLIC BLOOD PRESSURE: 76 MMHG | SYSTOLIC BLOOD PRESSURE: 118 MMHG | WEIGHT: 158 LBS | HEIGHT: 64 IN | HEART RATE: 98 BPM

## 2023-09-12 DIAGNOSIS — M76.821 POSTERIOR TIBIAL TENDONITIS, RIGHT: Primary | ICD-10-CM

## 2023-09-12 PROCEDURE — 99024 POSTOP FOLLOW-UP VISIT: CPT | Performed by: ORTHOPAEDIC SURGERY

## 2023-09-12 NOTE — PATIENT INSTRUCTIONS
Continue weightbearing as tolerated in the CAM boot for the next 3 weeks. Then, beginning October 1, you may begin weaning the boot and transitioning to a sneaker. You may begin weaning your boot and transitioning to a sneaker on October 1. It is important to do this gradually to avoid aggravating the healing process. Oct 1, you may come out of the boot into a sneaker for 2 hours. 2. Oct 2, you may come out of the boot into a sneaker for 4 hours,  3. The next day, you may come out of the boot into a sneaker for 6 hours. 4. Continue this (adding 2 hours per day) as you tolerate. For example, if you do 6 hours out of the boot into a sneaker and your foot swells more than usual at night and it is difficult to control the discomfort, do not advance to 8 hours the next day, stay at 6 hours until you are able to tolerate it. Elevation, Ice and tylenol and staying off of it at night will be important to aide in this transition out of the boot. Swelling and soreness are normal as you begin to do more with the injured leg. Continue aspirin/lovenox for blood clot prevention  May shower, do not soak in a tub/pool/ocean/etc for another 4 weeks. Begin PT  Scar massage- pea sized amount of lotion, massage into scar for 5 minutes each day. Compression stocking (Knee high, 20-30mm Hg) to be worn at all times while awake. Wear the boot at all times except when showering and in PT, even to sleep at night.

## 2023-09-12 NOTE — PROGRESS NOTES
Ysabel Griffiths M.D. Attending, Orthopaedic Surgery  Foot and Ankle  [facility]      ORTHOPAEDIC FOOT AND ANKLE POST-OP VISIT     Procedure:      Right [facility] with repair of posterior tibialis tendon       Date of surgery:   8/24/23      PLAN  1. Weightbearing Status - WBAT operative extremity  2. DVT prophylaxis - ASA 325mg BID  3. Continue PT/HEP as directed  4. Pain control - OTC pain medication  5. RTC in 4 week(s)  6. Xrays needed next visit - No    History of Present Illness:   Chief Complaint:   Chief Complaint   Patient presents with   • Follow-up     Patient is here for a 3 wk follow up after hardware removal      Cristina Jaffe is a 69 y.o. female who is being seen for 3 week post-operative visit for the above procedure. Pain is well controlled and the patient has successfully transitioned to OTC pain medicines. she is taking ASA 325mg BID for DVT prophylaxis. Patient has been WBAT in a CAM boot      Review of Systems:  General- denies fever/chills  Respiratory- denies cough or SOB  Cardio- denies chest pain or palpitations  GI- denies abdominal pain  Musculoskeletal- Negative except noted above  Skin- denies rashes or wounds    Physical Exam:   /76   Pulse 98   Ht 5' 4" (1.626 m)   Wt 71.7 kg (158 lb)   BMI 27.12 kg/m²   General/Constitutional: No apparent distress: well-nourished and well developed. Eyes: normal ocular motion  Lymphatic: No appreciable lymphadenopathy  Respiratory: Non-labored breathing  Vascular: No edema, swelling or tenderness, except as noted in detailed exam.  Integumentary: No impressive skin lesions present, except as noted in detailed exam.  Neuro: No ataxia or tremors noted  Psych: Normal mood and affect, oriented to person, place and time. Appropriate affect. Musculoskeletal: Normal, except as noted in detailed exam and in HPI.     Examination    right        Incision Clean, dry, intact  Sutures Removed this visit    Ecchymosis none Swelling mild    Sensation Intact to light touch throughout sural, saphenous, superficial peroneal, deep peroneal and medial/lateral plantar nerve distributions. Branch-Sara 5.07 filament (10g) testing deferred. Cardiovascular Brisk capillary refill < 2 seconds,intact DP and PT pulses    Special Tests None      Imaging Studies:   No new images      Scribe Attestation    I,:   am acting as a scribe while in the presence of the attending physician.:       I,:   personally performed the services described in this documentation    as scribed in my presence.:              [facility]. Lachman, MD  Foot & Ankle Surgery   Department of [address] [place] [address]      I personally performed the service. [facility].  Lachman, MD

## 2023-09-13 ENCOUNTER — EVALUATION (OUTPATIENT)
Dept: PHYSICAL THERAPY | Age: 78
End: 2023-09-13
Payer: MEDICARE

## 2023-09-13 VITALS — DIASTOLIC BLOOD PRESSURE: 68 MMHG | HEART RATE: 70 BPM | SYSTOLIC BLOOD PRESSURE: 118 MMHG

## 2023-09-13 DIAGNOSIS — M76.821 POSTERIOR TIBIAL TENDONITIS, RIGHT: Primary | ICD-10-CM

## 2023-09-13 DIAGNOSIS — M25.571 ACUTE RIGHT ANKLE PAIN: ICD-10-CM

## 2023-09-13 PROCEDURE — 97110 THERAPEUTIC EXERCISES: CPT

## 2023-09-13 PROCEDURE — 97162 PT EVAL MOD COMPLEX 30 MIN: CPT

## 2023-09-14 NOTE — PROGRESS NOTES
PT Evaluation     Today's date: 2023  Patient name: Amairani Norton  : 1945  MRN: [id number]  Referring provider: Ezekiel Huang MD  Dx:   Encounter Diagnosis     ICD-10-CM    1. Posterior tibial tendonitis, right  M76.821 Ambulatory Referral to Physical Therapy      2. Acute right ankle pain  M25.571                      Assessment  Impairments: abnormal gait, abnormal or restricted ROM, activity intolerance, impaired balance, impaired physical strength, lacks appropriate home exercise program, pain with function, weight-bearing intolerance and poor posture   Other impairment: s/p ORIF hardware removal 23 scar site hypersensitivity, fluctuating +1 edema right ankle, right ankle boot for ambulation with rolling walker  Functional limitations: + antalgic gait, wt bearing intolerance right le , decreased right ankle arom and decreased right ankle strength  Symptom irritability: moderateUnderstanding of Dx/Px/POC: good   Prognosis: good    Plan  Patient would benefit from: PT eval and skilled physical therapy  Referral necessary: Yes  Planned modality interventions: manual electrical stimulation and cryotherapy  Other planned modality interventions: as needed   BOOSt gait training gravity reduction   Planned therapy interventions: neuromuscular re-education, manual therapy, patient education, postural training, balance, strengthening, stretching, therapeutic activities, therapeutic exercise and home exercise program  Frequency: 2x week  Duration in weeks: 8  Plan of Care beginning date: 2023  Plan of Care expiration date: 11/10/2023  Treatment plan discussed with: patient and family        Subjective Evaluation    History of Present Illness  Onset date:  onset of significant right ankle pain   Date of surgery: 23 right ankle removal of orif hardware and post tibialis tendon repair.   Mechanism of injury: surgery          Not a recurrent problem   Quality of life: good    Patient Goals  Patient goals for therapy: decreased pain, increased strength, increased motion, independence with ADLs/IADLs, return to sport/leisure activities and improved balance  Patient goal: achieve non antalgic gait in 4 weeks   Pain  Current pain ratin  At best pain ratin  At worst pain ratin  Relieving factors: change in position, ice and rest  Aggravating factors: standing, walking, stair climbing, lifting and running  Progression: improved    Social Support  Steps to enter house: yes (3 steps no railing pt hold wall)  Stairs in house: no   Lives in: [facility] house  Lives with: spouse    Employment status: not working  Exercise history: likes to walk,     Treatments  Previous treatment: physical therapy  Current treatment: physical therapy        Objective           Precautions:allergies :sulfa antibiotics, azythromycin    wbat right le using rolling walker   Week 4 post surgery pt to wear sneaker 2 hours  A day and increase by 2hrs each day to tolerance per physician orders   Manuals 23             I eval             Right ankle scar massage in the future to tolerance                                       Neuro Re-Ed             Ankle isometrics right ankle 5 reps 5 sec hold            heelcord stretch with strap 1 min x 1            Ankle df/f, inver/ever right 10 reps             Toe towel crunches             Nu step              Ankle theraband start yellow right le                          Ther Ex                                                                                                                     Ther Activity                                       Gait Training                                       Modalities

## 2023-09-20 ENCOUNTER — OFFICE VISIT (OUTPATIENT)
Dept: PHYSICAL THERAPY | Age: 78
End: 2023-09-20
Payer: MEDICARE

## 2023-09-20 DIAGNOSIS — M76.821 POSTERIOR TIBIAL TENDONITIS, RIGHT: Primary | ICD-10-CM

## 2023-09-20 DIAGNOSIS — M25.571 ACUTE RIGHT ANKLE PAIN: ICD-10-CM

## 2023-09-20 PROCEDURE — 97110 THERAPEUTIC EXERCISES: CPT

## 2023-09-20 PROCEDURE — 97112 NEUROMUSCULAR REEDUCATION: CPT

## 2023-09-21 NOTE — PROGRESS NOTES
Daily Note     Today's date: 2023  Patient name: Aleksandar Cash  : 1945  MRN: [id number]  Referring provider: Nahum Cabrales MD  Dx:   Encounter Diagnosis     ICD-10-CM    1. Posterior tibial tendonitis, right  M76.821       2. Acute right ankle pain  M25.571                      Subjective: no new c/o's      Objective: See treatment diary below      Assessment: Tolerated treatment well. Patient would benefit from continued PT, Issued yellow t band for right ankle exer      Plan: Continue per plan of care.       Precautions:allergies :sulfa antibiotics, azythromycin    wbat right le using rolling walker   Week 4 post surgery pt to wear sneaker 2 hours  A day and increase by 2hrs each day to tolerance per physician orders   Manuals 23            I eval             Right ankle scar massage in the future to tolerance                                       Neuro Re-Ed             Ankle isometrics right ankle 5 reps 5 sec hold 10 reps            heelcord stretch with strap 1 min x 1 1 min x 1           Ankle df/f, inver/ever right 10 reps  10 reps            Toe towel crunches  30 reps            Nu step   10 min r 3           Ankle theraband start yellow right le  2 x 10           Sitting baps df/pf, inv,ever, ccw , cw circles  2 x 10           Ther Ex             Cotton ball or marble pickup   Will try at home with cotton balls                                                                                                      Ther Activity                                       Gait Training                                       Modalities

## 2023-09-27 ENCOUNTER — OFFICE VISIT (OUTPATIENT)
Dept: PHYSICAL THERAPY | Age: 78
End: 2023-09-27
Payer: MEDICARE

## 2023-09-27 DIAGNOSIS — M25.571 ACUTE RIGHT ANKLE PAIN: ICD-10-CM

## 2023-09-27 DIAGNOSIS — M76.821 POSTERIOR TIBIAL TENDONITIS, RIGHT: Primary | ICD-10-CM

## 2023-09-27 PROCEDURE — 97110 THERAPEUTIC EXERCISES: CPT

## 2023-09-27 PROCEDURE — 97530 THERAPEUTIC ACTIVITIES: CPT

## 2023-09-28 NOTE — PROGRESS NOTES
Daily Note     Today's date: 2023  Patient name: Gaivn Marroquin  : 1945  MRN: [id number]  Referring provider: Judi Roman MD  Dx:   Encounter Diagnosis     ICD-10-CM    1. Posterior tibial tendonitis, right  M76.821       2. Acute right ankle pain  M25.571                      Subjective: "Can I try to walk with a cane in the boot?"  Pt wt bearing as tolerated in right ankle boot trial st cane usage today. Objective: See treatment diary below      Assessment: Tolerated treatment well. Patient demonstrated fatigue post treatment      Plan: Continue per plan of care. Oct 1st begin change to sneaker for 2hours daily wearing time increased increments.       Precautions:allergies :sulfa antibiotics, azythromycin    wbat right le using rolling walker   Week 4 post surgery pt to wear sneaker 2 hours  A day and increase by 2hrs each day to tolerance per physician orders   Manuals 23           I eval             Right ankle scar massage in the future to tolerance                                       Neuro Re-Ed             Ankle isometrics right ankle 5 reps 5 sec hold 10 reps  10          heelcord stretch with strap 1 min x 1 1 min x 1 1minx 1          Ankle df/f, inver/ever right 10 reps  10 reps  20 reps           Toe towel crunches  30 reps            Nu step   10 min r 3 15 min r 3          Ankle theraband start yellow right le  2 x 10 Red 3 x 10          Sitting baps df/pf, inv,ever, ccw , cw circles  2 x 10 3 x 10  Level 2          Ther Ex             Cotton ball or marble pickup   Will try at home with cotton balls                                                                                                      Ther Activity                                       Gait Training                2 point gait training with st cane level surf and steps with 1 railing step to step gait                       Modalities

## 2023-10-04 ENCOUNTER — OFFICE VISIT (OUTPATIENT)
Dept: PHYSICAL THERAPY | Age: 78
End: 2023-10-04
Payer: MEDICARE

## 2023-10-04 DIAGNOSIS — M76.821 POSTERIOR TIBIAL TENDONITIS, RIGHT: Primary | ICD-10-CM

## 2023-10-04 PROCEDURE — 97110 THERAPEUTIC EXERCISES: CPT

## 2023-10-04 NOTE — PROGRESS NOTES
Daily Note     Today's date: 10/4/2023  Patient name: Cristina Jaffe  : 1945  MRN: [id number]  Referring provider: Ysabel Griffiths MD  Dx:   Encounter Diagnosis     ICD-10-CM    1. Posterior tibial tendonitis, right  M76.821                      Subjective: Pt. Reports a little increased ankle pain due to using regular foot wear. Objective: See treatment diary below       Assessment: Tolerated treatment well. Patient would benefit from continued PT      Plan: Continue per plan of care.       Precautions:allergies :sulfa antibiotics, azythromycin    wbat right le using rolling walker   Week 4 post surgery pt to wear sneaker 2 hours  A day and increase by 2hrs each day to tolerance per physician orders   Manuals 9/13/23 9/20 9/27 10/4          I eval             Right ankle scar massage in the future to tolerance                                       Neuro Re-Ed             Ankle isometrics right ankle 5 reps 5 sec hold 10 reps  10 10x         heelcord stretch with strap 1 min x 1 1 min x 1 1minx 1 1 min         Ankle df/f, inver/ever right 10 reps  10 reps  20 reps  20x         Toe towel crunches  30 reps            Nu step   10 min r 3 15 min r 3 15 min         Ankle theraband start yellow right le  2 x 10 Red 3 x 10          Sitting baps df/pf, inv,ever, ccw , cw circles  2 x 10 3 x 10  Level 2 L2 30x         Ther Ex             Cotton ball or marble pickup   Will try at home with cotton balls  5x                                                                                                    Ther Activity                                       Gait Training                2 point gait training with st cane level surf and steps with 1 railing step to step gait                       Modalities

## 2023-10-06 ENCOUNTER — OFFICE VISIT (OUTPATIENT)
Dept: PHYSICAL THERAPY | Age: 78
End: 2023-10-06
Payer: MEDICARE

## 2023-10-06 DIAGNOSIS — M76.821 POSTERIOR TIBIAL TENDONITIS, RIGHT: Primary | ICD-10-CM

## 2023-10-06 DIAGNOSIS — M25.571 ACUTE RIGHT ANKLE PAIN: ICD-10-CM

## 2023-10-06 PROCEDURE — 97530 THERAPEUTIC ACTIVITIES: CPT

## 2023-10-06 PROCEDURE — 97110 THERAPEUTIC EXERCISES: CPT

## 2023-10-07 NOTE — PROGRESS NOTES
Daily Note     Today's date: 10/6/2023  Patient name: Alessandro Rollins  : 1945  MRN: [id number]  Referring provider: Summer Benavides MD  Dx:   Encounter Diagnosis     ICD-10-CM    1. Posterior tibial tendonitis, right  M76.821       2. Acute right ankle pain  M25.571                      Subjective: "My right foot pain is a 4 ."   Pt told to decrease wt bearing when pain in foot increasing return to boot . Currently pt using st cane and regular sneakers. Pt reporting fearful of stair ambulation with no railing present      Objective: See treatment diary below      Assessment: Tolerated treatment well. Patient would benefit from continued PT. Emphasis on stair training step to step gait with and without assistive device . Plan: Continue per plan of care.       Precautions:allergies :sulfa antibiotics, azythromycin    wbat right le using rolling walker   Week 4 post surgery pt to wear sneaker 2 hours  A day and increase by 2hrs each day to tolerance per physician orders   Manuals 9/13/23 9/20 9/27 10/4 10/6         I eval             Right ankle scar massage in the future to tolerance                                       Neuro Re-Ed             Ankle isometrics right ankle 5 reps 5 sec hold 10 reps  10 10x 10        heelcord stretch with strap 1 min x 1 1 min x 1 1minx 1 1 min 1min x 1        Ankle df/f, inver/ever right 10 reps  10 reps  20 reps  20x         Toe towel crunches  30 reps            Nu step   10 min r 3 15 min r 3 15 min 15 min r 4        Ankle theraband start yellow right le  2 x 10 Red 3 x 10  Green 3 x 10        Sitting baps df/pf, inv,ever, ccw , cw circles  2 x 10 3 x 10  Level 2 L2 30x l2 30 reps        Ther Ex             Cotton ball or marble pickup   Will try at home with cotton balls  5x         gastroc stretch in standing     30 sec x 2                                                                                      Ther Activity Gait Training             Stair training   2 point gait training with st cane level surf and steps with 1 railing step to step gait  Wt st cane with and without railing 3 steps x 5 step to step gait                     Modalities

## 2023-10-10 ENCOUNTER — OFFICE VISIT (OUTPATIENT)
Dept: OBGYN CLINIC | Facility: CLINIC | Age: 78
End: 2023-10-10

## 2023-10-10 VITALS
WEIGHT: 158 LBS | HEIGHT: 64 IN | RESPIRATION RATE: 16 BRPM | HEART RATE: 85 BPM | DIASTOLIC BLOOD PRESSURE: 76 MMHG | SYSTOLIC BLOOD PRESSURE: 124 MMHG | BODY MASS INDEX: 26.98 KG/M2

## 2023-10-10 DIAGNOSIS — S86.111A RUPTURE OF RIGHT POSTERIOR TIBIALIS TENDON, INITIAL ENCOUNTER: ICD-10-CM

## 2023-10-10 DIAGNOSIS — M76.821 POSTERIOR TIBIAL TENDONITIS, RIGHT: Primary | ICD-10-CM

## 2023-10-10 PROCEDURE — 99024 POSTOP FOLLOW-UP VISIT: CPT | Performed by: ORTHOPAEDIC SURGERY

## 2023-10-10 RX ORDER — VIT A/VIT C/VIT E/ZINC/COPPER 4296-226
CAPSULE ORAL
COMMUNITY
Start: 2018-01-01

## 2023-10-10 RX ORDER — GINGER ROOT/GINGER ROOT EXT 262.5 MG
CAPSULE ORAL
COMMUNITY
Start: 2000-01-01

## 2023-10-10 NOTE — PROGRESS NOTES
Fabio Sol M.D. Attending, Orthopaedic Surgery  Foot and Ankle  [address] [facility]      ORTHOPAEDIC FOOT AND ANKLE POST-OP VISIT     Procedure:      Right [facility] with repair of posterior tibialis tendon       Date of surgery:   8/24/23      PLAN  1. Weightbearing Status - WBAT operative extremity  2. DVT prophylaxis - Completed  3. Continue PT/HEP as directed  4. Pain control - OTC pain medication  5. RTC PRN  6. Xrays needed next visit - No    History of Present Illness:   Chief Complaint:   Chief Complaint   Patient presents with   • Right Ankle - Post-op       Cesar Lockett is a 69 y.o. female who is being seen for 6 week post-operative visit for the above procedure. Pain is well controlled and the patient has successfully transitioned to OTC pain medicines. she is taking ASA 325mg BID for DVT prophylaxis. Patient has been WBAT in a Supportive sneaker      Review of Systems:  General- denies fever/chills  Respiratory- denies cough or SOB  Cardio- denies chest pain or palpitations  GI- denies abdominal pain  Musculoskeletal- Negative except noted above  Skin- denies rashes or wounds    Physical Exam:   /76 (BP Location: Right arm, Patient Position: Sitting, Cuff Size: Standard)   Pulse 85   Resp 16   Ht 5' 4" (1.626 m)   Wt 71.7 kg (158 lb)   BMI 27.12 kg/m²   General/Constitutional: No apparent distress: well-nourished and well developed. Eyes: normal ocular motion  Lymphatic: No appreciable lymphadenopathy  Respiratory: Non-labored breathing  Vascular: No edema, swelling or tenderness, except as noted in detailed exam.  Integumentary: No impressive skin lesions present, except as noted in detailed exam.  Neuro: No ataxia or tremors noted  Psych: Normal mood and affect, oriented to person, place and time. Appropriate affect. Musculoskeletal: Normal, except as noted in detailed exam and in HPI.     Examination    right        Incision Clean, dry, intact  Sutures Previously removed. Ecchymosis none    Swelling mild    Sensation Intact to light touch throughout sural, saphenous, superficial peroneal, deep peroneal and medial/lateral plantar nerve distributions. [place]-Sara 5.07 filament (10g) testing deferred. Cardiovascular Brisk capillary refill < 2 seconds,intact DP and PT pulses    Special Tests None      Imaging Studies:   No new images    Scribe Attestation    I,:  Michael Simpson am acting as a scribe while in the presence of the attending physician.:       I,:  Ezekiel Huang MD personally performed the services described in this documentation    as scribed in my presence.:              Carolee Christian. Lachman, MD  Foot & Ankle Surgery   Department of [address] [place] [address]      I personally performed the service. Carolee Christian. Lachman, MD

## 2023-11-21 ENCOUNTER — APPOINTMENT (RX ONLY)
Dept: URBAN - METROPOLITAN AREA CLINIC 129 | Facility: CLINIC | Age: 78
Setting detail: DERMATOLOGY
End: 2023-11-21

## 2023-11-21 DIAGNOSIS — D22 MELANOCYTIC NEVI: ICD-10-CM

## 2023-11-21 DIAGNOSIS — D18.0 HEMANGIOMA: ICD-10-CM

## 2023-11-21 DIAGNOSIS — L82.1 OTHER SEBORRHEIC KERATOSIS: ICD-10-CM

## 2023-11-21 DIAGNOSIS — L57.0 ACTINIC KERATOSIS: ICD-10-CM | Status: INADEQUATELY CONTROLLED

## 2023-11-21 DIAGNOSIS — L81.4 OTHER MELANIN HYPERPIGMENTATION: ICD-10-CM

## 2023-11-21 DIAGNOSIS — Z71.89 OTHER SPECIFIED COUNSELING: ICD-10-CM

## 2023-11-21 DIAGNOSIS — I83.9 ASYMPTOMATIC VARICOSE VEINS OF LOWER EXTREMITIES: ICD-10-CM

## 2023-11-21 PROBLEM — D22.62 MELANOCYTIC NEVI OF LEFT UPPER LIMB, INCLUDING SHOULDER: Status: ACTIVE | Noted: 2023-11-21

## 2023-11-21 PROBLEM — D22.72 MELANOCYTIC NEVI OF LEFT LOWER LIMB, INCLUDING HIP: Status: ACTIVE | Noted: 2023-11-21

## 2023-11-21 PROBLEM — D22.5 MELANOCYTIC NEVI OF TRUNK: Status: ACTIVE | Noted: 2023-11-21

## 2023-11-21 PROBLEM — D22.71 MELANOCYTIC NEVI OF RIGHT LOWER LIMB, INCLUDING HIP: Status: ACTIVE | Noted: 2023-11-21

## 2023-11-21 PROBLEM — I83.93 ASYMPTOMATIC VARICOSE VEINS OF BILATERAL LOWER EXTREMITIES: Status: ACTIVE | Noted: 2023-11-21

## 2023-11-21 PROBLEM — D22.39 MELANOCYTIC NEVI OF OTHER PARTS OF FACE: Status: ACTIVE | Noted: 2023-11-21

## 2023-11-21 PROBLEM — D18.01 HEMANGIOMA OF SKIN AND SUBCUTANEOUS TISSUE: Status: ACTIVE | Noted: 2023-11-21

## 2023-11-21 PROBLEM — D22.61 MELANOCYTIC NEVI OF RIGHT UPPER LIMB, INCLUDING SHOULDER: Status: ACTIVE | Noted: 2023-11-21

## 2023-11-21 PROCEDURE — ? SUNSCREEN RECOMMENDATIONS

## 2023-11-21 PROCEDURE — 99213 OFFICE O/P EST LOW 20 MIN: CPT | Mod: 25

## 2023-11-21 PROCEDURE — ? COUNSELING

## 2023-11-21 PROCEDURE — 17003 DESTRUCT PREMALG LES 2-14: CPT

## 2023-11-21 PROCEDURE — ? LIQUID NITROGEN

## 2023-11-21 PROCEDURE — 17000 DESTRUCT PREMALG LESION: CPT

## 2023-11-21 ASSESSMENT — LOCATION DETAILED DESCRIPTION DERM
LOCATION DETAILED: LEFT ANTERIOR DISTAL UPPER ARM
LOCATION DETAILED: LEFT PROXIMAL POSTERIOR THIGH
LOCATION DETAILED: SUPERIOR LUMBAR SPINE
LOCATION DETAILED: LEFT ANTERIOR PROXIMAL THIGH
LOCATION DETAILED: SUPERIOR MID FOREHEAD
LOCATION DETAILED: NASAL DORSUM
LOCATION DETAILED: RIGHT PROXIMAL POSTERIOR UPPER ARM
LOCATION DETAILED: RIGHT DISTAL DORSAL FOREARM
LOCATION DETAILED: RIGHT PROXIMAL PRETIBIAL REGION
LOCATION DETAILED: PERIUMBILICAL SKIN
LOCATION DETAILED: RIGHT DISTAL POSTERIOR THIGH
LOCATION DETAILED: RIGHT ANTERIOR DISTAL UPPER ARM
LOCATION DETAILED: EPIGASTRIC SKIN
LOCATION DETAILED: RIGHT INFERIOR MEDIAL FOREHEAD
LOCATION DETAILED: LEFT PROXIMAL PRETIBIAL REGION
LOCATION DETAILED: RIGHT ANTECUBITAL SKIN
LOCATION DETAILED: LEFT ANTERIOR DISTAL THIGH
LOCATION DETAILED: RIGHT ANTERIOR PROXIMAL THIGH
LOCATION DETAILED: RIGHT RADIAL DORSAL HAND
LOCATION DETAILED: RIGHT ANTERIOR DISTAL THIGH
LOCATION DETAILED: XIPHOID
LOCATION DETAILED: LEFT DISTAL POSTERIOR UPPER ARM
LOCATION DETAILED: INFERIOR THORACIC SPINE

## 2023-11-21 ASSESSMENT — LOCATION ZONE DERM
LOCATION ZONE: HAND
LOCATION ZONE: ARM
LOCATION ZONE: NOSE
LOCATION ZONE: LEG
LOCATION ZONE: TRUNK
LOCATION ZONE: FACE

## 2023-11-21 ASSESSMENT — LOCATION SIMPLE DESCRIPTION DERM
LOCATION SIMPLE: RIGHT UPPER ARM
LOCATION SIMPLE: RIGHT PRETIBIAL REGION
LOCATION SIMPLE: LEFT UPPER ARM
LOCATION SIMPLE: UPPER BACK
LOCATION SIMPLE: RIGHT FOREHEAD
LOCATION SIMPLE: RIGHT POSTERIOR THIGH
LOCATION SIMPLE: RIGHT ELBOW
LOCATION SIMPLE: LEFT PRETIBIAL REGION
LOCATION SIMPLE: LOWER BACK
LOCATION SIMPLE: LEFT POSTERIOR THIGH
LOCATION SIMPLE: SUPERIOR FOREHEAD
LOCATION SIMPLE: NOSE
LOCATION SIMPLE: ABDOMEN
LOCATION SIMPLE: RIGHT FOREARM
LOCATION SIMPLE: RIGHT HAND
LOCATION SIMPLE: LEFT THIGH
LOCATION SIMPLE: RIGHT THIGH

## 2023-11-21 NOTE — PROCEDURE: LIQUID NITROGEN
Consent: The patient's consent was obtained including but not limited to risks of crusting, scabbing, blistering, scarring, darker or lighter pigmentary change, recurrence, incomplete removal and infection.
Show Aperture Variable?: Yes
Post-Care Instructions: I reviewed with the patient in detail post-care instructions. Patient is to wear sunprotection, and avoid picking at any of the treated lesions. Pt may apply Vaseline to crusted or scabbing areas.
Duration Of Freeze Thaw-Cycle (Seconds): 10
Number Of Freeze-Thaw Cycles: 1 freeze-thaw cycle
Render Note In Bullet Format When Appropriate: No
Application Tool (Optional): Liquid Nitrogen Sprayer
Detail Level: Detailed

## 2023-11-21 NOTE — PROCEDURE: SUNSCREEN RECOMMENDATIONS

## 2024-06-10 ENCOUNTER — TELEPHONE (OUTPATIENT)
Dept: GASTROENTEROLOGY | Facility: CLINIC | Age: 79
End: 2024-06-10

## 2024-06-10 NOTE — TELEPHONE ENCOUNTER
Pt needs to be scheduled for an office visit due to age to discuss possible colonoscopy with Dr Johnson for hx of sessile polyp, hx of colonic polyps. I lmom for pt to please call back to schedule. Will call again if do not hear back from her.

## 2024-06-17 NOTE — TELEPHONE ENCOUNTER
I started to leave message for pt to please call me back to schedule an appt to discuss a colonoscopy with Dr Johnson and then phone hung up before I could leave phone number. Will mail recall letter to pt if do not hear back from her.

## 2024-12-09 ENCOUNTER — APPOINTMENT (OUTPATIENT)
Dept: URBAN - METROPOLITAN AREA CLINIC 129 | Facility: CLINIC | Age: 79
Setting detail: DERMATOLOGY
End: 2024-12-09

## 2024-12-09 DIAGNOSIS — D18.0 HEMANGIOMA: ICD-10-CM

## 2024-12-09 DIAGNOSIS — L57.0 ACTINIC KERATOSIS: ICD-10-CM | Status: INADEQUATELY CONTROLLED

## 2024-12-09 DIAGNOSIS — D22 MELANOCYTIC NEVI: ICD-10-CM

## 2024-12-09 DIAGNOSIS — Z71.89 OTHER SPECIFIED COUNSELING: ICD-10-CM

## 2024-12-09 DIAGNOSIS — L82.1 OTHER SEBORRHEIC KERATOSIS: ICD-10-CM

## 2024-12-09 DIAGNOSIS — L57.8 OTHER SKIN CHANGES DUE TO CHRONIC EXPOSURE TO NONIONIZING RADIATION: ICD-10-CM | Status: INADEQUATELY CONTROLLED

## 2024-12-09 DIAGNOSIS — L81.4 OTHER MELANIN HYPERPIGMENTATION: ICD-10-CM

## 2024-12-09 PROBLEM — D22.61 MELANOCYTIC NEVI OF RIGHT UPPER LIMB, INCLUDING SHOULDER: Status: ACTIVE | Noted: 2024-12-09

## 2024-12-09 PROBLEM — D22.39 MELANOCYTIC NEVI OF OTHER PARTS OF FACE: Status: ACTIVE | Noted: 2024-12-09

## 2024-12-09 PROBLEM — D22.62 MELANOCYTIC NEVI OF LEFT UPPER LIMB, INCLUDING SHOULDER: Status: ACTIVE | Noted: 2024-12-09

## 2024-12-09 PROBLEM — D18.01 HEMANGIOMA OF SKIN AND SUBCUTANEOUS TISSUE: Status: ACTIVE | Noted: 2024-12-09

## 2024-12-09 PROBLEM — D22.71 MELANOCYTIC NEVI OF RIGHT LOWER LIMB, INCLUDING HIP: Status: ACTIVE | Noted: 2024-12-09

## 2024-12-09 PROBLEM — D22.5 MELANOCYTIC NEVI OF TRUNK: Status: ACTIVE | Noted: 2024-12-09

## 2024-12-09 PROBLEM — D22.72 MELANOCYTIC NEVI OF LEFT LOWER LIMB, INCLUDING HIP: Status: ACTIVE | Noted: 2024-12-09

## 2024-12-09 PROCEDURE — 17003 DESTRUCT PREMALG LES 2-14: CPT

## 2024-12-09 PROCEDURE — ? PRESCRIPTION MEDICATION MANAGEMENT

## 2024-12-09 PROCEDURE — ? LIQUID NITROGEN

## 2024-12-09 PROCEDURE — 17000 DESTRUCT PREMALG LESION: CPT

## 2024-12-09 PROCEDURE — ? PRESCRIPTION

## 2024-12-09 PROCEDURE — 99214 OFFICE O/P EST MOD 30 MIN: CPT | Mod: 25

## 2024-12-09 PROCEDURE — ? SUNSCREEN RECOMMENDATIONS

## 2024-12-09 PROCEDURE — ? COUNSELING

## 2024-12-09 ASSESSMENT — LOCATION DETAILED DESCRIPTION DERM
LOCATION DETAILED: LEFT ANTERIOR DISTAL THIGH
LOCATION DETAILED: RIGHT DISTAL POSTERIOR THIGH
LOCATION DETAILED: RIGHT PROXIMAL POSTERIOR UPPER ARM
LOCATION DETAILED: RIGHT ANTERIOR DISTAL THIGH
LOCATION DETAILED: INFERIOR THORACIC SPINE
LOCATION DETAILED: LEFT DISTAL POSTERIOR UPPER ARM
LOCATION DETAILED: RIGHT ANTECUBITAL SKIN
LOCATION DETAILED: EPIGASTRIC SKIN
LOCATION DETAILED: PERIUMBILICAL SKIN
LOCATION DETAILED: RIGHT ANTERIOR PROXIMAL THIGH
LOCATION DETAILED: RIGHT INFERIOR MEDIAL FOREHEAD
LOCATION DETAILED: LEFT PROXIMAL POSTERIOR THIGH
LOCATION DETAILED: SUPERIOR LUMBAR SPINE
LOCATION DETAILED: NASAL DORSUM
LOCATION DETAILED: LEFT ANTERIOR DISTAL UPPER ARM
LOCATION DETAILED: RIGHT ANTERIOR DISTAL UPPER ARM
LOCATION DETAILED: LEFT ANTERIOR PROXIMAL THIGH
LOCATION DETAILED: SUPERIOR MID FOREHEAD
LOCATION DETAILED: XIPHOID

## 2024-12-09 ASSESSMENT — LOCATION SIMPLE DESCRIPTION DERM
LOCATION SIMPLE: RIGHT UPPER ARM
LOCATION SIMPLE: RIGHT POSTERIOR UPPER ARM
LOCATION SIMPLE: RIGHT FOREHEAD
LOCATION SIMPLE: LEFT POSTERIOR THIGH
LOCATION SIMPLE: RIGHT THIGH
LOCATION SIMPLE: LEFT THIGH
LOCATION SIMPLE: ABDOMEN
LOCATION SIMPLE: LEFT UPPER ARM
LOCATION SIMPLE: RIGHT ELBOW
LOCATION SIMPLE: RIGHT POSTERIOR THIGH
LOCATION SIMPLE: UPPER BACK
LOCATION SIMPLE: SUPERIOR FOREHEAD
LOCATION SIMPLE: NOSE
LOCATION SIMPLE: LOWER BACK

## 2024-12-09 ASSESSMENT — LOCATION ZONE DERM
LOCATION ZONE: LEG
LOCATION ZONE: NOSE
LOCATION ZONE: FACE
LOCATION ZONE: TRUNK
LOCATION ZONE: ARM

## 2024-12-09 NOTE — PROCEDURE: PRESCRIPTION MEDICATION MANAGEMENT
Detail Level: Zone
Render In Strict Bullet Format?: No
Initiate Treatment: Flourocal cream- Apply 1 gram BID to affected areas on face for 5-7 days as tolerated.

## 2024-12-09 NOTE — PROCEDURE: LIQUID NITROGEN
Duration Of Freeze Thaw-Cycle (Seconds): 3
Application Tool (Optional): Liquid Nitrogen Sprayer
Aperture Size (Optional): C
Consent: The patient's consent was obtained including but not limited to risks of crusting, scabbing, blistering, scarring, darker or lighter pigmentary change, recurrence, incomplete removal and infection.
Post-Care Instructions: I reviewed with the patient in detail post-care instructions. Patient is to wear sunprotection, and avoid picking at any of the treated lesions. Pt may apply Vaseline to crusted or scabbing areas.
Render Note In Bullet Format When Appropriate: No
Show Aperture Variable?: Yes
Detail Level: Detailed
Number Of Freeze-Thaw Cycles: 2 freeze-thaw cycles

## (undated) DEVICE — 1.25MM THREADED GUIDE WIRE 150MM

## (undated) DEVICE — GLOVE SRG BIOGEL 8

## (undated) DEVICE — PLUMEPEN PRO 10FT

## (undated) DEVICE — PADDING CAST 4 IN  COTTON STRL

## (undated) DEVICE — DRAPE C-ARM X-RAY

## (undated) DEVICE — DRAPE SHEET THREE QUARTER

## (undated) DEVICE — 1.25MM NON-THREADED GUIDE WIRE 150MM

## (undated) DEVICE — GLOVE INDICATOR PI UNDERGLOVE SZ 8.5 BLUE

## (undated) DEVICE — SPLINT 5 X 30 IN FAST SET PLASTER

## (undated) DEVICE — GLOVE SRG BIOGEL 8.5

## (undated) DEVICE — SUT VICRYL 2-0 CTB-1 36 IN JB945

## (undated) DEVICE — STOCKINETTE REGULAR

## (undated) DEVICE — DRAPE C-ARMOUR

## (undated) DEVICE — BETHLEHEM UNIVERSAL  MIONR EXT: Brand: CARDINAL HEALTH

## (undated) DEVICE — BANDAGE, ESMARK LF STR 6"X9' (20/CS): Brand: CYPRESS

## (undated) DEVICE — CHLORAPREP HI-LITE 26ML ORANGE

## (undated) DEVICE — SPONGE PVP SCRUB WING STERILE

## (undated) DEVICE — GLOVE INDICATOR PI UNDERGLOVE SZ 8 BLUE

## (undated) DEVICE — CAST PADDING 6 IN SYNTHETIC STRL

## (undated) DEVICE — ACE WRAP 6 IN UNSTERILE

## (undated) DEVICE — PENCIL ELECTROSURG E-Z CLEAN -0035H

## (undated) DEVICE — 2.7MM CORTEX SCREW SELF-TAPPING 20MM: Type: IMPLANTABLE DEVICE | Status: NON-FUNCTIONAL

## (undated) DEVICE — U-DRAPE: Brand: CONVERTORS

## (undated) DEVICE — INTENDED FOR TISSUE SEPARATION, AND OTHER PROCEDURES THAT REQUIRE A SHARP SURGICAL BLADE TO PUNCTURE OR CUT.: Brand: BARD-PARKER SAFETY BLADES SIZE 10, STERILE

## (undated) DEVICE — GAUZE SPONGES,16 PLY: Brand: CURITY

## (undated) DEVICE — SUT ETHILON 3-0 FS-1 18 IN 663G

## (undated) DEVICE — DRAPE EQUIPMENT RF WAND

## (undated) DEVICE — PADDING CAST 6IN COTTON STRL

## (undated) DEVICE — DRESSING XEROFORM 5 X 9

## (undated) DEVICE — SUT VICRYL 2-0 CT-2 18 IN J726D

## (undated) DEVICE — KERLIX BANDAGE ROLL: Brand: KERLIX

## (undated) DEVICE — CUFF TOURNIQUET 30 X 4 IN QUICK CONNECT DISP 1BLA

## (undated) DEVICE — SUT VICRYL 4-0 PS-2 18 IN J496G

## (undated) DEVICE — SPONGE SCRUB 4 PCT CHLORHEXIDINE

## (undated) DEVICE — REM POLYHESIVE ADULT PATIENT RETURN ELECTRODE: Brand: VALLEYLAB

## (undated) DEVICE — INTENDED FOR TISSUE SEPARATION, AND OTHER PROCEDURES THAT REQUIRE A SHARP SURGICAL BLADE TO PUNCTURE OR CUT.: Brand: BARD-PARKER ® CARBON RIB-BACK BLADES

## (undated) DEVICE — GLOVE SRG BIOGEL 7.5

## (undated) DEVICE — COVER ROLL 8 IN X 2 YD STRETCH TAPE

## (undated) DEVICE — 2.0MM DRILL BIT WITH DEPTH MARK/QC/140MM

## (undated) DEVICE — 3M™ MICROFOAM™ SURGICAL TAPE 4 ROLLS/CARTON 6 CARTONS/CASE 1528-3: Brand: 3M™ MICROFOAM™

## (undated) DEVICE — OCCLUSIVE GAUZE STRIP,3% BISMUTH TRIBROMOPHENATE IN PETROLATUM BLEND: Brand: XEROFORM

## (undated) DEVICE — ABDOMINAL PAD: Brand: DERMACEA

## (undated) DEVICE — 2.7MM CANNULATED DRILL BIT/QC 160MM

## (undated) DEVICE — BULB SYRINGE,IRRIGATION WITH PROTECTIVE CAP: Brand: DOVER

## (undated) DEVICE — SUT ETHILON 3-0 PS-1 18 IN 1663G

## (undated) DEVICE — UNIVERSAL MAJOR EXTREMITY,KIT: Brand: CARDINAL HEALTH

## (undated) DEVICE — ANTIBACTERIAL UNDYED BRAIDED (POLYGLACTIN 910), SYNTHETIC ABSORBABLE SUTURE: Brand: COATED VICRYL

## (undated) DEVICE — 2.5MM DRILL BIT/QC/GOLD/110MM